# Patient Record
Sex: MALE | Race: WHITE | NOT HISPANIC OR LATINO | Employment: FULL TIME | URBAN - METROPOLITAN AREA
[De-identification: names, ages, dates, MRNs, and addresses within clinical notes are randomized per-mention and may not be internally consistent; named-entity substitution may affect disease eponyms.]

---

## 2019-03-10 ENCOUNTER — HOSPITAL ENCOUNTER (EMERGENCY)
Facility: CLINIC | Age: 34
Discharge: HOME | End: 2019-03-10
Attending: NURSE PRACTITIONER | Admitting: NURSE PRACTITIONER
Payer: COMMERCIAL

## 2019-03-10 VITALS
RESPIRATION RATE: 18 BRPM | OXYGEN SATURATION: 100 % | TEMPERATURE: 97.8 F | WEIGHT: 220 LBS | SYSTOLIC BLOOD PRESSURE: 121 MMHG | BODY MASS INDEX: 29.03 KG/M2 | HEART RATE: 90 BPM | DIASTOLIC BLOOD PRESSURE: 76 MMHG

## 2019-03-10 DIAGNOSIS — M54.50 LUMBAR BACK PAIN: ICD-10-CM

## 2019-03-10 PROCEDURE — 99282 EMERGENCY DEPT VISIT SF MDM: CPT | Performed by: NURSE PRACTITIONER

## 2019-03-10 PROCEDURE — 99284 EMERGENCY DEPT VISIT MOD MDM: CPT | Mod: Z6 | Performed by: NURSE PRACTITIONER

## 2019-03-10 RX ORDER — PREDNISONE 10 MG/1
TABLET ORAL
Qty: 32 TABLET | Refills: 0 | Status: SHIPPED | OUTPATIENT
Start: 2019-03-10 | End: 2019-03-20

## 2019-03-10 RX ORDER — CYCLOBENZAPRINE HCL 10 MG
10 TABLET ORAL 3 TIMES DAILY PRN
Qty: 20 TABLET | Refills: 0 | Status: SHIPPED | OUTPATIENT
Start: 2019-03-10 | End: 2019-03-16

## 2019-03-10 RX ORDER — OXYCODONE HYDROCHLORIDE 5 MG/1
5 TABLET ORAL EVERY 6 HOURS PRN
Qty: 12 TABLET | Refills: 0 | Status: SHIPPED | OUTPATIENT
Start: 2019-03-10 | End: 2020-03-16

## 2019-03-10 ASSESSMENT — ENCOUNTER SYMPTOMS: BACK PAIN: 1

## 2019-03-10 NOTE — ED PROVIDER NOTES
"  History     Chief Complaint   Patient presents with     Back Pain     HPI  Art Reece is a 34 year old male who presents to the ED today with c/o lower lumbar back pain since yesterday. Patient endorses long standing hx of back pain but it has not been this bad in \"quite some time\".  Patient has been taking Ibuprofen without any relief.  Patient did take one dose of Dilaudid, which was his wife's, and also did not obtain any relief from this either. Patient denies any numbness/tingling down his legs, denies any loss of bowel/bladder control.  Patient denies any injury/trauma.     Allergies:  No Known Allergies    Problem List:    There are no active problems to display for this patient.       Past Medical History:    Past Medical History:   Diagnosis Date     Back pain        Past Surgical History:    History reviewed. No pertinent surgical history.    Family History:    No family history on file.    Social History:  Marital Status:   [2]  Social History     Tobacco Use     Smoking status: Current Every Day Smoker     Packs/day: 0.50   Substance Use Topics     Alcohol use: Yes     Alcohol/week: 12.0 oz     Types: 24 Cans of beer per week     Comment: occ     Drug use: No        Medications:      cyclobenzaprine (FLEXERIL) 10 MG tablet   oxyCODONE (ROXICODONE) 5 MG tablet   predniSONE (DELTASONE) 10 MG tablet   Acetaminophen (TYLENOL PO)   ibuprofen (ADVIL,MOTRIN) 200 MG tablet   Nutritional Supplements (MELATONIN PO)         Review of Systems   Musculoskeletal: Positive for back pain.   All other systems reviewed and are negative.      Physical Exam   BP: 136/86  Pulse: 93  Temp: 97.8  F (36.6  C)  Resp: 18  Weight: 99.8 kg (220 lb)  SpO2: 100 %      Physical Exam   Constitutional: He is oriented to person, place, and time. He appears well-developed and well-nourished. No distress.   HENT:   Head: Normocephalic.   Eyes: Pupils are equal, round, and reactive to light.   Neck: Normal range of motion. "   Cardiovascular: Normal rate.   Pulmonary/Chest: Effort normal.   Abdominal: Soft. Bowel sounds are normal.   Musculoskeletal: Normal range of motion. He exhibits tenderness (across lower lumbar back, no spinous process tenderness). He exhibits no deformity.   Neurological: He is alert and oriented to person, place, and time. He displays normal reflexes. No cranial nerve deficit. Coordination normal.   Skin: Skin is warm and dry. Capillary refill takes less than 2 seconds. He is not diaphoretic.   Psychiatric: He has a normal mood and affect.       ED Course       Procedures    No results found for this or any previous visit (from the past 24 hour(s)).    Medications - No data to display    Assessments & Plan (with Medical Decision Making)  Lumbar back pain  Recommend follow up with Dr. Cristina/outpatient MRI if symptoms persist.  No neuro/focal deficits, no sign of cauda equina.  Will start patient on Prednisone taper for inflammation, Oxycodone for severe pain, narcotic safety and side effects discussed in detail.  Flexeril for muscle spasms.    Reasons to return to the ED discussed in detail.  Patient agreeable to plan of care and discharged in stable condition.      I have reviewed the nursing notes.    I have reviewed the findings, diagnosis, plan and need for follow up with the patient.     Medication List      Started    cyclobenzaprine 10 MG tablet  Commonly known as:  FLEXERIL  10 mg, Oral, 3 TIMES DAILY PRN     oxyCODONE 5 MG tablet  Commonly known as:  ROXICODONE  5 mg, Oral, EVERY 6 HOURS PRN     predniSONE 10 MG tablet  Commonly known as:  DELTASONE  Take 4 tablets daily for 5 days,  take 2 tablets daily for 3 days, take 1 tablet daily for 3 days, take half a tablet for 3 days.            Final diagnoses:   Lumbar back pain       3/10/2019   [facility] EMERGENCY DEPARTMENT     Tiff Cunningham, GEE CNP  03/10/19 1521

## 2019-03-10 NOTE — ED AVS SNAPSHOT
[facility] Emergency Department  [address] [place] [address]  Phone:  [phone]  Fax:  [phone]                                    Art Reece   MRN: [id number]    Department:  [facility] Emergency Department   Date of Visit:  3/10/2019           After Visit Summary Signature Page    I have received my discharge instructions, and my questions have been answered. I have discussed any challenges I see with this plan with the nurse or doctor.    ..........................................................................................................................................  Patient/Patient Representative Signature      ..........................................................................................................................................  Patient Representative Print Name and Relationship to Patient    ..................................................               ................................................  Date                                   Time    ..........................................................................................................................................  Reviewed by Signature/Title    ...................................................              ..............................................  Date                                               Time          22EPIC Rev 08/18

## 2019-03-10 NOTE — LETTER
March 10, 2019      To Whom It May Concern:      Art Reece was seen in our Emergency Department today, 03/10/19. Please excuse him from work tomorrow.    Thank you      Sincerely,        GEE Boothe CNP

## 2019-03-28 ENCOUNTER — ANCILLARY PROCEDURE (OUTPATIENT)
Dept: GENERAL RADIOLOGY | Facility: CLINIC | Age: 34
End: 2019-03-28
Attending: PHYSICIAN ASSISTANT
Payer: COMMERCIAL

## 2019-03-28 ENCOUNTER — TELEPHONE (OUTPATIENT)
Dept: NEUROSURGERY | Facility: CLINIC | Age: 34
End: 2019-03-28

## 2019-03-28 ENCOUNTER — OFFICE VISIT (OUTPATIENT)
Dept: NEUROSURGERY | Facility: CLINIC | Age: 34
End: 2019-03-28
Payer: COMMERCIAL

## 2019-03-28 VITALS — BODY MASS INDEX: 27.72 KG/M2 | HEIGHT: 74 IN | WEIGHT: 216 LBS | TEMPERATURE: 97.8 F

## 2019-03-28 DIAGNOSIS — G89.29 CHRONIC BILATERAL LOW BACK PAIN, WITH SCIATICA PRESENCE UNSPECIFIED: ICD-10-CM

## 2019-03-28 DIAGNOSIS — G89.29 CHRONIC BILATERAL LOW BACK PAIN, WITH SCIATICA PRESENCE UNSPECIFIED: Primary | ICD-10-CM

## 2019-03-28 DIAGNOSIS — M54.5 CHRONIC BILATERAL LOW BACK PAIN, WITH SCIATICA PRESENCE UNSPECIFIED: Primary | ICD-10-CM

## 2019-03-28 DIAGNOSIS — M54.5 CHRONIC BILATERAL LOW BACK PAIN, WITH SCIATICA PRESENCE UNSPECIFIED: ICD-10-CM

## 2019-03-28 PROCEDURE — 99203 OFFICE O/P NEW LOW 30 MIN: CPT | Performed by: PHYSICIAN ASSISTANT

## 2019-03-28 PROCEDURE — 72100 X-RAY EXAM L-S SPINE 2/3 VWS: CPT | Mod: TC

## 2019-03-28 ASSESSMENT — MIFFLIN-ST. JEOR: SCORE: 1981.58

## 2019-03-28 NOTE — TELEPHONE ENCOUNTER
Per Art ok to speak with Deann, they are calling to find out results of the xray from today and they would also like to know what the next step should be. Please call Deann to advise. Thank You Shania

## 2019-03-28 NOTE — PROGRESS NOTES
"Art Reece is a 34 year old male who presents for:  Chief Complaint   Patient presents with     Neurologic Problem     back pain          Initial Vitals:  Temp 97.8  F (36.6  C) (Temporal)   Ht 6' 1.5\" (1.867 m)   Wt 216 lb (98 kg)   BMI 28.11 kg/m   Estimated body mass index is 28.11 kg/m  as calculated from the following:    Height as of this encounter: 6' 1.5\" (1.867 m).    Weight as of this encounter: 216 lb (98 kg).. Body surface area is 2.25 meters squared. BP completed using cuff size: N/A  Data Unavailable        Nursing Comments:         Aram Patel CMA    "

## 2019-03-28 NOTE — LETTER
3/28/2019         RE: Art Reece  [address] [place] [facility] [address]        Dear Colleague,    Thank you for referring your patient, Art Reece, to the [facility]. Please see a copy of my visit note below.    Dr. Jun Cristina  [facility]  Neurosurgery Clinic Visit      CC: Low back pain    Primary care Provider: No Ref-Primary, Physician      Reason For Visit:   I was asked by Tiff Cunningham CNP to consult on the patient for lumbar back pain.      HPI: Art Reece is a 34 year old male who presents for evaluation of chronic low back pain x that flared up 2-3 weeks ago. He was recently seen at [facility] ED on 3/10 and discharged with prednisone taper, oxycodone, and flexeril. Pain is located in bilateral low back and radiates up to his neck or down into leg in no particular pattern it just shoots if he twists wrong. Describes the pain as a constant ache with shooting pain when he twists wrong. Pain is worsened with movement, sitting/standing too long, or twisting the wrong way. Pain is alleviated with prednisone the most and also slightly with flexeril and oxycodone. States BLE numbness when he sits for too long. No Denies bladder/bowel incontinence.    Current pain: 5-6/10 At worst: 10/10    Past Medical History:   Diagnosis Date     Back pain        Past Medical History reviewed with patient during visit.    No past surgical history on file.  Past Surgical History reviewed with patient during visit.    Current Outpatient Medications   Medication     Acetaminophen (TYLENOL PO)     ibuprofen (ADVIL,MOTRIN) 200 MG tablet     Nutritional Supplements (MELATONIN PO)     oxyCODONE (ROXICODONE) 5 MG tablet     No current facility-administered medications for this visit.        No Known Allergies    Social History     Socioeconomic History     Marital status:      Spouse name: Not on file     Number of children: Not on file     Years of education: Not on file      Highest education level: Not on file   Occupational History     Not on file   Social Needs     Financial resource strain: Not on file     Food insecurity:     Worry: Not on file     Inability: Not on file     Transportation needs:     Medical: Not on file     Non-medical: Not on file   Tobacco Use     Smoking status: Current Every Day Smoker     Packs/day: 0.50   Substance and Sexual Activity     Alcohol use: Yes     Alcohol/week: 12.0 oz     Types: 24 Cans of beer per week     Comment: occ     Drug use: No     Sexual activity: Yes     Partners: Female   Lifestyle     Physical activity:     Days per week: Not on file     Minutes per session: Not on file     Stress: Not on file   Relationships     Social connections:     Talks on phone: Not on file     Gets together: Not on file     Attends [employer] service: Not on file     Active member of club or organization: Not on file     Attends meetings of clubs or organizations: Not on file     Relationship status: Not on file     Intimate partner violence:     Fear of current or ex partner: Not on file     Emotionally abused: Not on file     Physically abused: Not on file     Forced sexual activity: Not on file   Other Topics Concern     Not on file   Social History Narrative     Not on file       No family history on file.       ROS: 10 point ROS neg other than the symptoms noted above in the HPI.    Vital Signs: There were no vitals taken for this visit.    Examination:  Constitutional:  Alert, well nourished, NAD.  HEENT: Normocephalic, atraumatic.   Pulmonary:  Without shortness of breath, normal effort.   Lymph: no lymphadenopathy to low back or LE.   Integumentary: Skin is free of rashes or lesions.   Cardiovascular:  No pitting edema of BLE.      Neurological:  Awake  Alert  Oriented x 3  Speech clear  Cranial nerves II - XII grossly intact  PERRL  EOMI  Face symmetric  Tongue midline  Motor exam   Hip Flexor:                Right: 5/5  Left:  5/5  Hip Adductor:              Right:  5/5  Left:  5/5  Hip Abductor:             Right:  5/5  Left:  5/5  Gastroc Soleus:        Right:  5/5  Left:  5/5  Tib/Ant:                      Right:  5/5  Left:  5/5  EHL:                          Right:  5/5  Left:  5/5       Sensation normal to bilateral lower extremities.    Reflexes are 2+ in the patellar and Achilles. There is no clonus. Downgoing Babinski.  Musculoskeletal:  Gait: Able to stand from a seated position. Normal non-antalgic, non-myelopathic gait.  Able to heel/toe walk without loss of balance  Lumbar examination reveals no tenderness of the spine or paraspinous muscles.  Hip height is symmetrical. Negative SI joint, sciatic notch or greater trochanteric tenderness to palpation bilaterally.  Straight leg raise is negative bilaterally.      Imaging:   Lumbar xray obtained today with no acute changes. Mild disc degeneration at L5-S1    Assessment/Plan:   Art Reece is a 34 year old male who presents for evaluation of chronic low back pain x that flared up 2-3 weeks ago. He was recently seen at [facility] ED on 3/10 and discharged with prednisone taper, oxycodone, and flexeril. Pain is located in bilateral low back and radiates up to his neck or down into leg in no particular pattern it just shoots if he twists wrong. Describes the pain as a constant ache with shooting pain when he twists wrong.  Lumbar XR reviewed today. Will have him start a course of physical therapy and obtain lumbar MRI for further evaluation as no relief with conservative treatment and call him with results. Patient voiced understanding and agreement.          Marlene Kaufman PA-C  [facility]  [address] [address]    Tel [phone]  Pager [phone]      Art Reece is a 34 year old male who presents for:  Chief Complaint   Patient presents with     Neurologic Problem     back pain          Initial Vitals:  Temp 97.8  F (36.6  C)  "(Temporal)   Ht 6' 1.5\" (1.867 m)   Wt 216 lb (98 kg)   BMI 28.11 kg/m    Estimated body mass index is 28.11 kg/m  as calculated from the following:    Height as of this encounter: 6' 1.5\" (1.867 m).    Weight as of this encounter: 216 lb (98 kg).. Body surface area is 2.25 meters squared. BP completed using cuff size: N/A  Data Unavailable        Nursing Comments:         Aram Patel CMA      Again, thank you for allowing me to participate in the care of your patient.        Sincerely,        Marlene Kaufman PA-C    "

## 2019-03-28 NOTE — PATIENT INSTRUCTIONS
Lumbar xray ordered - please complete today    Physical therapy referral placed - they will contact you to schedule    Lumbar MRI ordered - I will contact you with results

## 2019-03-29 NOTE — TELEPHONE ENCOUNTER
Per Marlene GALLOWAY, XR looks good with no acute fracture or other changes. Would recommend starting PT, as this appears to be more muscular. If pain persists, can obtain lumbar MRI for further evaluation.    Discussed with patient and he voiced agreement with plan. PT order already placed. Advised patient to call back if symptoms persist.

## 2019-04-17 ENCOUNTER — HOSPITAL ENCOUNTER (OUTPATIENT)
Dept: PHYSICAL THERAPY | Facility: CLINIC | Age: 34
Setting detail: THERAPIES SERIES
End: 2019-04-17
Attending: PHYSICIAN ASSISTANT
Payer: COMMERCIAL

## 2019-04-17 DIAGNOSIS — M54.5 CHRONIC BILATERAL LOW BACK PAIN, WITH SCIATICA PRESENCE UNSPECIFIED: ICD-10-CM

## 2019-04-17 DIAGNOSIS — G89.29 CHRONIC BILATERAL LOW BACK PAIN, WITH SCIATICA PRESENCE UNSPECIFIED: ICD-10-CM

## 2019-04-17 PROCEDURE — 97161 PT EVAL LOW COMPLEX 20 MIN: CPT | Mod: GP

## 2019-04-17 PROCEDURE — 97110 THERAPEUTIC EXERCISES: CPT | Mod: GP

## 2019-04-17 NOTE — PROGRESS NOTES
"   04/17/19 1446   General Information   Type of Visit Initial OP Ortho PT Evaluation   Start of Care Date 04/17/19   Referring Physician Marlene Kaufman PA-C   Orders Evaluate and Treat   Date of Order 03/29/19   Medical Diagnosis Low Back Pain   Surgical/Medical history reviewed Yes   General Information Comments Patient has had LBP for 8 years. They said he was out of shape and did some sort of massage. They said his spine had a deformity with limited curves and would need surgery. His back has always been painful but he tolerates it because he's used to it. On an off day recently, he had extremely bad back pain after getting up from the chair and went to the ER because the pain was \"different and worse\". Patient utilizes a back brace which gets painful after awhile. Patient says flexerol will work but he doesn't like medication.       Present No   Body Part(s)   Body Part(s) Lumbar Spine/SI   Presentation and Etiology   Impairments A. Pain;D. Decreased ROM;E. Decreased flexibility;F. Decreased strength and endurance;H. Impaired gait;K. Numbness   Functional Limitations perform activities of daily living;perform required work activities;perform desired leisure / sports activities   Symptom Location Low Back with radicular symptoms down the right side or up to his neck.   How/Where did it occur From insidious onset   Onset date of current episode/exacerbation 01/01/11   Chronicity Chronic   Pain rating (0-10 point scale) Best (/10);Worst (/10)   Best (/10) 3   Worst (/10) 10   Pain quality H. Other;A. Sharp;F. Stabbing   Pain quality comment Constant Pressure w/ occasional sharp spike   Frequency of pain/symptoms A. Constant   Pain/symptoms are: Worse in the morning   Pain/symptoms exacerbated by A. Sitting;B. Walking;C. Lifting;D. Carrying;G. Certain positions;K. Home tasks;L. Work tasks;I. Bending   Pain/symptoms eased by J. Braces/supports;I. OTC medication(s);K. Other;G. Heat   Pain eased by " comment Stretch Hamstring and Lumbar Spine   Progression of symptoms since onset: Worsened   Current / Previous Interventions   Diagnostic Tests: X-ray   X-ray Results unremarkable   Prior Level of Function   Prior Level of Function-Mobility Independent   Prior Level of Function-ADLs Independent   Current Level of Function   Current Community Support Family/friend caregiver   Patient role/employment history A. Employed   Employment Comments Works at a [employer] and occasionally works 14-15 hours. Lots of walking, lifting and manual labor.   Living environment House/townhome   Home/community accessibility Independent   Current equipment-Gait/Locomotion None   Current equipment-ADL None   Fall Risk Screen   Fall screen completed by PT   Have you fallen 2 or more times in the past year? No   Have you fallen and had an injury in the past year? No   Is patient a fall risk? No   System Outcome Measures   Outcome Measures Low Back Pain (see Oswestry and Cj)   Lumbar Spine/SI Objective Findings   Observation  Swayback posture in standing. Patient tolerates sitting most in a slightly flexed position   Integumentary Unremarkable   Posture Swayback posture. Flexed sitting posture.   Gait/Locomotion Unremarkable   Flexion ROM Full w/ pain on return to flexion   Extension ROM Limited and painfull   Right Side Bending ROM Full and painfree   Left Side Bending ROM Full and painfree   Lumbar ROM Comment Only limitation is extension   Hip Flexion (L2) Strength 5/5   Hip Abduction Strength 5/5   Hip Extension Strength 5/5   Knee Flexion Strength 5/5   Knee Extension (L3) Strength 5/5   Ankle Dorsiflexion (L4) Strength 5/5   Great Toe Extension (L5) Strength 5/5   Ankle Plantar Flexion (S1) Strength 5/5   Lumbar/Hip/Knee/Foot Strength Comments Strong and painfree   Hamstring Flexibility Tight   Hip Flexor Flexibility Tight   Quadricep Flexibility Tight   Piriformis Flexibility Tight   Lumbar/SI Flexibility Comments Tight and  limited mobility   SLR Negative   Slump Test Negative   Planned Therapy Interventions   Planned Therapy Interventions balance training;manual therapy;joint mobilization;neuromuscular re-education;ROM;strengthening;stretching   Planned Therapy Interventions Comment Reduce pain, muscle tightness, and rebalance muscles. Potentially aquatic therapy.   Planned Modality Interventions   Planned Modality Interventions Biofeedback;Cryotherapy;Electrical stimulation;Hot packs;Hydrotherapy;TENS;Traction;Ultrasound   Planned Modality Interventions Comments PRN Only   Clinical Impression   Criteria for Skilled Therapeutic Interventions Met yes, treatment indicated   PT Diagnosis Low Back Pain associated with muscle spasms   Influenced by the following impairments Pain with back extension. High Tone in the lumbar spine. Limited Mobility. Muscle Tightness.   Functional limitations due to impairments Patient is unable to tolerate leaning backwards or lumbar extension exercises without pain. Patient is unable to work without a back brace. Patient is unable to sleep at night due to pain.   Clinical Presentation Stable/Uncomplicated   Clinical Presentation Rationale Based on observation, evaluation and clinical reasoning.   Clinical Decision Making (Complexity) Low complexity   Therapy Frequency 1 time/week   Predicted Duration of Therapy Intervention (days/wks) 8-12 weeks   Risk & Benefits of therapy have been explained Yes   Patient, Family & other staff in agreement with plan of care Yes   Clinical Impression Comments Patient presents with chronic low back pain that presents like back spasms. He works multiple shifts and long hours and he is only able to work these with a back brace. Patient will likely respond well to 1. Core Strengthening, 2. Flexiblity and lumbar mobility exercises and 3. Aquatic therapy if the patient responds poorly to the first two options. Because of his rigorous lifestyle and his chronic symptoms patient  may take a long time to recover.   ORTHO GOALS   PT Ortho Eval Goals 1;2;3   Ortho Goal 1   Goal Identifier Cj   Goal Description Patinet will improve score on the Cj to 2 or less   Target Date 07/15/19   Ortho Goal 2   Goal Identifier JOSEPH   Goal Description Patient will improve score on the JOSEPH to 8 or less   Target Date 07/15/19   Ortho Goal 3   Goal Identifier Extension ROM   Goal Description Patient will restore full Extension AROM in standing without increased pain.   Target Date 07/15/19   Total Evaluation Time   PT Dre, Low Complexity Minutes (48287) 35

## 2019-05-03 ENCOUNTER — HOSPITAL ENCOUNTER (OUTPATIENT)
Dept: PHYSICAL THERAPY | Facility: CLINIC | Age: 34
Setting detail: THERAPIES SERIES
End: 2019-05-03
Attending: PHYSICIAN ASSISTANT
Payer: COMMERCIAL

## 2019-05-03 PROCEDURE — 97110 THERAPEUTIC EXERCISES: CPT | Mod: GP

## 2019-05-09 ENCOUNTER — HOSPITAL ENCOUNTER (OUTPATIENT)
Dept: PHYSICAL THERAPY | Facility: CLINIC | Age: 34
Setting detail: THERAPIES SERIES
End: 2019-05-09
Attending: PHYSICIAN ASSISTANT
Payer: COMMERCIAL

## 2019-05-09 PROCEDURE — 97110 THERAPEUTIC EXERCISES: CPT | Mod: GP

## 2019-05-09 PROCEDURE — 97530 THERAPEUTIC ACTIVITIES: CPT | Mod: GP

## 2019-05-09 NOTE — PROGRESS NOTES
Outpatient Physical Therapy Discharge Note     Patient: Art Reece  : 1985    Beginning/End Dates of Reporting Period:  2019 to 2019    Referring Provider: Marlene Kaufman PA-C    Therapy Diagnosis: Low Back Pain     Client Self Report: Patient reports his back is much better. His core sore from the plank and exercises but overall he's been doing quite well.     Objective Measurements:  Objective Measure: Cj  Details: 1  Objective Measure: JOSEPH  Details: 5                              Outcome Measures (most recent score):  Cj STarT Sub-Score (Q5-9): 0  Cj STarT Total Score (all 9): 1  Oswestry Score (%): 10 %    Goals:  Goal Identifier Cj   Goal Description Patient will improve score on the Cj to 2 or less   Target Date 07/15/19   Date Met  19   Progress: Patient reduced Cj to a score of 1     Goal Identifier JOSEPH   Goal Description Patient will improve score on the JOSEPH to 8 or less   Target Date 07/15/19   Date Met  19   Progress: Patient reduced JOSEPH to a score of 5     Goal Identifier Extension ROM   Goal Description Patient will restore full Extension AROM in standing without increased pain.   Target Date 07/15/19   Date Met      Progress: Patient has slight pain at end range Extension, but can achieve end range.     Progress Toward Goals:   Progress this reporting period: Patient's pain has reduced significantly and he is comfortable with his home exercise program. Patient can work 12 hours without increases in low back pain. Patient also has full lumbar flexion, rotation and side bending without pain.          Plan:  Discharge from therapy.    Discharge:    Reason for Discharge: Patient has met most goals and no skilled PT is required.  .    Equipment Issued: None    Discharge Plan: Patient to continue home program.

## 2020-03-12 ENCOUNTER — TELEPHONE (OUTPATIENT)
Dept: ORTHOPEDICS | Facility: OTHER | Age: 35
End: 2020-03-12

## 2020-03-12 NOTE — TELEPHONE ENCOUNTER
Spoke with pt and his wife that we are unable to give narcotics without being seen.  They have tried the ER they were seen at initially and were unsuccessful. Per Meenakshi Atwood PA-C they were instructed to take Ibuprofen 800 mg and alternate with tylenol up to 1000 mg and ice if this is not working they may need to go to the ER again.

## 2020-03-12 NOTE — TELEPHONE ENCOUNTER
Patient was given #15 of norco on 3/9/2020 for a left elbow fracture - was seen at [facility]. He will be out of medication tomorrow and has an appt with GAY Brown on 3/16/2020.      Jaye BUENROSTRO, Lead RN, BSN. . .  3/12/2020, 9:12 AM

## 2020-03-12 NOTE — TELEPHONE ENCOUNTER
Art was seen in the ED and was given pain medication.  He was told not to follow up until next week for his injury.  He will be out of pain medication on Friday.  He has no primary provider to request pain medication from.  Please advise.

## 2020-03-16 ENCOUNTER — ANCILLARY PROCEDURE (OUTPATIENT)
Dept: GENERAL RADIOLOGY | Facility: CLINIC | Age: 35
End: 2020-03-16
Attending: PHYSICIAN ASSISTANT
Payer: OTHER MISCELLANEOUS

## 2020-03-16 ENCOUNTER — OFFICE VISIT (OUTPATIENT)
Dept: ORTHOPEDICS | Facility: CLINIC | Age: 35
End: 2020-03-16
Payer: OTHER MISCELLANEOUS

## 2020-03-16 VITALS
BODY MASS INDEX: 28.28 KG/M2 | DIASTOLIC BLOOD PRESSURE: 72 MMHG | HEIGHT: 73 IN | WEIGHT: 213.4 LBS | SYSTOLIC BLOOD PRESSURE: 120 MMHG

## 2020-03-16 DIAGNOSIS — S52.135A CLOSED NONDISPLACED FRACTURE OF NECK OF LEFT RADIUS, INITIAL ENCOUNTER: Primary | ICD-10-CM

## 2020-03-16 DIAGNOSIS — M25.522 LEFT ELBOW PAIN: ICD-10-CM

## 2020-03-16 DIAGNOSIS — S52.125A CLOSED NONDISPLACED FRACTURE OF HEAD OF LEFT RADIUS, INITIAL ENCOUNTER: ICD-10-CM

## 2020-03-16 DIAGNOSIS — S02.2XXA CLOSED FRACTURE OF NASAL BONE, INITIAL ENCOUNTER: ICD-10-CM

## 2020-03-16 PROCEDURE — 73080 X-RAY EXAM OF ELBOW: CPT | Mod: TC

## 2020-03-16 PROCEDURE — 99204 OFFICE O/P NEW MOD 45 MIN: CPT | Performed by: PHYSICIAN ASSISTANT

## 2020-03-16 ASSESSMENT — PAIN SCALES - GENERAL: PAINLEVEL: MILD PAIN (2)

## 2020-03-16 ASSESSMENT — MIFFLIN-ST. JEOR: SCORE: 1956.86

## 2020-03-16 NOTE — PROGRESS NOTES
ORTHOPEDIC CONSULT      Chief Complaint: Art Reece is a 35 year old right hand dominant male who works at [employer].  He enjoys splitting wood and guns and fishing occasionally.    He is being seen for   Chief Complaints and History of Present Illnesses   Patient presents with     Consult     left elbow injury from fall on 3/9/20?         History of Present Illness:   Mechanism of Injury: Patient fell off an 8 foot wall so they consider the fall to be about 14 feet with his height.  Patient did hit his face.  Location: Left elbow  Duration of Pain: 7 days.  Date of injury is 3/9/2020.  This is a Workmen's Comp. injury.  Rating of Pain: 1-2 out of 10  Pain Quality: Achy  Pain is better with: Splint  Pain is worse with: Bumping splint  Treatment so far consists of: Multiple scans at [facility].  Patient did get a CT scan of his face cervical spine thoracic spine and a CT scan of his head.  He also had 3D recons on the thoracic spine.  Patient got AP and lateral of right and left knee.  All these studies were scanned into our system today also left elbow x-rays.  Patient is on Norco for pain.  Associated Features: Left wrist pain secondary to splint.  Left elbow pain.  Right wrist is feeling good without any pain.  Prior history of related problems: No previous surgery or trauma to the left elbow.  Pain is Limiting: Activity with the left upper extremity and work  Here to: Orthopedic consultation  The Pain Has: Gotten slightly better  Additional History: Patient complaining of left wrist pain secondary to the splint.  We do not want to remove the splint but we did modify today.  We talked about this today.      Patient's past medical, surgical, social and family histories reviewed.     Past Medical History:   Diagnosis Date     Back pain         No past surgical history on file.    Medications:  Acetaminophen (TYLENOL PO), Take  by mouth.    ibuprofen (ADVIL,MOTRIN) 200 MG tablet, Take 3  "tablets (600 mg) by mouth every 8 hours as needed for pain  Nutritional Supplements (MELATONIN PO), Take  by mouth.      No current facility-administered medications on file prior to visit.       Allergies   Allergen Reactions     Norco [Hydrocodone-Acetaminophen] Other (See Comments)     Very sedated, unable to function        Social History     Occupational History     Not on file   Tobacco Use     Smoking status: Current Every Day Smoker     Packs/day: 0.50     Smokeless tobacco: Never Used   Substance and Sexual Activity     Alcohol use: Yes     Alcohol/week: 20.0 standard drinks     Types: 24 Cans of beer per week     Comment: occ     Drug use: No     Sexual activity: Yes     Partners: Female     No pertinent family history     REVIEW OF SYSTEMS  10 point review systems performed otherwise negative as noted as per history of present illness.    Physical Exam:  Vitals: /72   Ht 1.854 m (6' 1\")   Wt 96.8 kg (213 lb 6.4 oz)   BMI 28.15 kg/m    BMI= Body mass index is 28.15 kg/m .    Constitutional: healthy, alert and no acute distress   Psychiatric: mentation appears normal and affect normal/bright  NEURO: no focal deficits, CMS intact left upper extremity   RESP: Normal with easy respirations and no use of accessory muscles to breathe, no audible wheezing or retractions  CV: +2 radial pulse and his hand is warm to palpation.   SKIN: Patient has a long-arm posterior splint on.  I did remove the Ace wrap to loosen it.  Patient did not have any cast abrasions or splint abrasions proximally or distally.  The distal side of the splint did come out to his hand and he was having some wrist pain so we did modify that and shorten it so he has wrist range of motion now.  The rest of the hand and upper arm that I can see with no erythema, rashes, excoriation, or breakdown. No evidence of infection.   MUSCULOSKELETAL:    INSPECTION of left elbow: No gross deformities, erythema, edema, ecchymosis, atrophy or " fasciculations of the skin that I can see today.  Patient has a long-arm posterior splint on.    PALPATION: No tenderness to palpation of the hand or wrist or upper arm.  No increased warmth noted.    ROM: Patient able to move all 5 digits and able to flex and extendwrist.  Patient able to forward flex and do slight Codman's with the left shoulder.  No range of motion of the elbow as we are concerned about fragments in the joint.     STRENGTH: 5 out of 5 , interosseous and thumb without pain.     SPECIAL TEST: None today  GAIT: non-antalgic  Lymph: no palpable lymph nodes    Diagnostic Modalities:  Recent Results (from the past 744 hour(s))   XR Elbow Left G/E 3 Views    Narrative    LEFT ELBOW THREE OR MORE VIEWS  3/16/2020 10:12 AM     HISTORY: Left elbow pain.      Impression    IMPRESSION: Radial head/neck fracture. No radiographically apparent  radial head articular surface depression. However, CT would be more  sensitive in this regard. On the lateral view, coronoid process  fracture cannot be excluded as well.     I agree with the above reading.  Also I did review these x-rays with Dr. Mckenna and we feel that there could be some fracture fragment in the articular area.  We are getting a CT scan to further evaluate this.    There were scans that were done at [facility] that did get scanned in today:     I also reviewed AP and lateral of right and left knee that has no fracture no dislocation or tumor.    I reviewed a CT of the face, CT cervical spine, CT thoracic spine, CT head.  Nasal fracture notified but the rest of the scans seem to be okay by my review.    Independent visualization of the images was performed.    SPLINT MODIFICATION: Patient having some left wrist discomfort in the posterior splint did come up over the wrist.  I was able to use a cast saw and trim back the fiberglass/Ortho-Glass and remove the portion that was going over the wrist.  This will allow him to have wrist range of  motion.  Patient immediately felt that the removal of this section of the splint helped tremendously for his wrist pain.  CMS intact before and after modification of splint.  No splint abrasions noted on the skin of the wrist.    Impression:   1.  7 days status post left radial head and neck fracture, intra-articular.  2.  7 days status post nasal fracture  3.  Workmen's Comp. injury    Plan:  All of the above pertinent physical exam and imaging modalities findings was reviewed with Art and his wife.                                          CONSERVATIVE CARE:    Patient Instructions:  1. Xrays: Today I reviewed x-rays done today on the left elbow as well as x-rays done that were scanned into our system from [place] and done on 3/9/2020.  I looked at left elbow x-rays from [place] as well as left and right knee x-rays as well as a CT of the face and head and cervical and thoracic area.  2. Anticoagulation: Not needed for these fractures.  Minimal risk of clot  3. Pain Medication: Continue with oxycodone as needed also Tylenol or ibuprofen alternating is good idea.  Rest ice and elevation is the main stay.  4. Weight Bearing: Nonweightbearing left upper extremity  5. Activity/Therapy: Range of motion of fingers hand and wrist and shoulder on the left side but no range of motion of elbow.  6. Cast/Splint/Brace/Sling: Keep splint on clean and dry until you hear more from us.  There is some concern about fragments in your joints we do not want your elbow to move yet until we confirm that there is not fragments in your joint.    Today we modified yourSplint so that you can now move that left wrist better.  7. Work: This is a Workmen's Comp. injury.  Today we stated off work for the next 3 weeks, the duration of off work will depend on the CT scan we are getting and possibility of surgery.  When the patient goes back to work he will need restrictions.  8.  Nasal fracture: We do not follow the facial fractures here  in orthopedics.  We put in an order for ENT.  They are upstairs.  You will get a call in a day or 2 to get this set up.  They can state if you do not need any further follow-up.  I feel better having them see you once to make sure.  9.  CT scan: I have ordered an CT for you.  Please call [phone] to schedule your CT.  I will call you after the CT with the results and plan.   10.  Plan: If there are no fragments in the elbow then we might be able to treat this with just the sling and range of motion after 2 weeks.  If there are fragments in the elbow then we would be discussing possible surgery to get those fragments out.  It would be a longer recovery.  Follow-up:  I will call you with the results and a plan after the CT scan is done.  Re-x-ray on return: Yes, AP lateral and oblique of left elbow.  Check with provider on this just secondary to the fact that we are getting a CT scan and what course the patient will be going on.    BP Readings from Last 1 Encounters:   03/16/20 120/72       BP noted to be well controlled today in office.      Patient does use Tobacco products. Patient not ready to quit at this time.  Strongly encouraged smoking cessation.  Risks of smoking and benefits of quitting were discussed.  Information offered: AVS with information about stopping smoking and advised to discuss smoking cessation medications/strategies with Primary care provider.  3-5 Minutes were spent in counseling.    I did discuss this case with Dr. Mckenna and show him the x-rays to confirm the proper treatment plan.  He did want a CT scan to make sure that there was nothing in the joint before we proceeded with nonoperative treatment.    This note was dictated with PerioSeal.    Alejandro Brown PA-C

## 2020-03-16 NOTE — LETTER
[address] [address]  Phone: [phone]  Fax: [phone]    March 16, 2020        Art Reece  [address] [facility] [address]          To whom it may concern:    RE: Art Reece    Patient was seen and treated today at our clinic.  Patient may not return to work for 3 weeks.  Will re-elevate after CT scan.    Please contact me for questions or concerns.      Sincerely,        Alejandro Brown PA-C

## 2020-03-16 NOTE — LETTER
[address] [address]  Phone: [phone]  Fax: [phone]    March 16, 2020        Art Reece  [address] [facility] [address]          To whom it may concern:    RE: Art Reece    Patient was seen and treated today at our clinic.  Patient may not return to work for 3 weeks.  Will elevate after CT scan.    Please contact me for questions or concerns.      Sincerely,        Alejandro Brown PA-C

## 2020-03-16 NOTE — PATIENT INSTRUCTIONS
Encounter Diagnoses   Name Primary?     Closed nondisplaced fracture of head of left radius, initial encounter      Closed nondisplaced fracture of neck of left radius, initial encounter Yes     Closed fracture of nasal bone, initial encounter      Rest, ice and elevate above heart level as needed for pain control  Plan:  1. Xrays: Today I reviewed x-rays done today on the left elbow as well as x-rays done that were scanned into our system from [place] and done on 3/9/2020.  I looked at left elbow x-rays from [place] as well as left and right knee x-rays as well as a CT of the face and head and cervical and thoracic area.  2. Anticoagulation: Not needed for these fractures.  Minimal risk of clot  3. Pain Medication: Continue with oxycodone as needed also Tylenol or ibuprofen alternating is good idea.  Rest ice and elevation is the main stay.  4. Weight Bearing: Nonweightbearing left upper extremity  5. Activity/Therapy: Range of motion of fingers hand and wrist and shoulder on the left side but no range of motion of elbow.  6. Cast/Splint/Brace/Sling: Keep splint on clean and dry until you hear more from us.  There is some concern about fragments in your joints we do not want your elbow to move yet until we confirm that there is not fragments in your joint.    Today we modified yourSplint so that you can now move that left wrist better.  7. Work: This is a Workmen's Comp. injury.  Today we stated off work for the next 3 weeks, the duration of off work will depend on the CT scan we are getting and possibility of surgery.  When the patient goes back to work he will need restrictions.  8.  Nasal fracture: We do not follow the facial fractures here in orthopedics.  We put in an order for ENT.  They are upstairs.  You will get a call in a day or 2 to get this set up.  They can state if you do not need any further follow-up.  I feel better having them see you once to make sure.  9.  CT scan: I have ordered an CT for  you.  Please call [phone] to schedule your CT.  I will call you after the CT with the results and plan.   10.  Plan: If there are no fragments in the elbow then we might be able to treat this with just the sling and range of motion after 2 weeks.  If there are fragments in the elbow then we would be discussing possible surgery to get those fragments out.  It would be a longer recovery.  Follow-up:  I will call you with the results and a plan after the CT scan is done.    [employer] and [employer] may offer reliable information regarding your diagnosis and treatment plan.    THANK YOU for coming in today. If you receive a survey via [employer] or mail please let us know if there was anything you especially appreciated today or if there is any way we can improve our clinic. We appreciate your input.    GENERAL INFORMATION:  Our hours are:  Pending    [facility] for any issues or concerns: [phone]      We are not in the office Thursdays. Therefore non- urgent calls and medical messages received on Thursday will be addressed when we are back in the office on Wednesday. Urgent matters will be reviewed and addressed by one of our partners in the office as needed.    If lab work was done today as part of your evaluation you will generally be contacted via [employer], mail, or phone with the results within 1-5 days. If there is an alarming result we will contact you by phone. Lab results come back at varying times, I generally wait until all labs are resulted before making comments on results. Please note labs are automatically released to [employer] (if you have signed up for it) once available-at times you may see these prior to my having a chance to review them as well.    If you need refills please contact your pharmacist. They will send a refill request to me to review. Please allow 3 business days for us to process all refill requests. All narcotic refills should be handled in the clinic at  the time of your visit.

## 2020-03-16 NOTE — LETTER
[address] [address]  Phone: [phone]  Fax: [phone]    March 16, 2020        Art Reece  [address] [facility] [address]          To whom it may concern:    RE: Art Reece    Patient was seen and treated today at our clinic.  Patient may not return to work for 3 weeks.  Will re-evaluate after CT scan.    Please contact me for questions or concerns.      Sincerely,        Alejandro Brown PA-C

## 2020-03-16 NOTE — LETTER
3/16/2020         RE: Art Reece  [id number] [facility] [address]        Dear Colleague,    Thank you for referring your patient, Art Reece, to the [facility]. Please see a copy of my visit note below.    ORTHOPEDIC CONSULT      Chief Complaint: Art Reece is a 35 year old right hand dominant male who works at [employer].  He enjoys splitting wood and guns and fishing occasionally.    He is being seen for   Chief Complaints and History of Present Illnesses   Patient presents with     Consult     left elbow injury from fall on 3/9/20?         History of Present Illness:   Mechanism of Injury: Patient fell off an 8 foot wall so they consider the fall to be about 14 feet with his height.  Patient did hit his face.  Location: Left elbow  Duration of Pain: 7 days.  Date of injury is 3/9/2020.  This is a Workmen's Comp. injury.  Rating of Pain: 1-2 out of 10  Pain Quality: Achy  Pain is better with: Splint  Pain is worse with: Bumping splint  Treatment so far consists of: Multiple scans at [facility].  Patient did get a CT scan of his face cervical spine thoracic spine and a CT scan of his head.  He also had 3D recons on the thoracic spine.  Patient got AP and lateral of right and left knee.  All these studies were scanned into our system today also left elbow x-rays.  Patient is on Norco for pain.  Associated Features: Left wrist pain secondary to splint.  Left elbow pain.  Right wrist is feeling good without any pain.  Prior history of related problems: No previous surgery or trauma to the left elbow.  Pain is Limiting: Activity with the left upper extremity and work  Here to: Orthopedic consultation  The Pain Has: Gotten slightly better  Additional History: Patient complaining of left wrist pain secondary to the splint.  We do not want to remove the splint but we did modify today.  We talked about this today.      Patient's past medical, surgical,  "social and family histories reviewed.     Past Medical History:   Diagnosis Date     Back pain         No past surgical history on file.    Medications:  Acetaminophen (TYLENOL PO), Take  by mouth.    ibuprofen (ADVIL,MOTRIN) 200 MG tablet, Take 3 tablets (600 mg) by mouth every 8 hours as needed for pain  Nutritional Supplements (MELATONIN PO), Take  by mouth.      No current facility-administered medications on file prior to visit.       Allergies   Allergen Reactions     Norco [Hydrocodone-Acetaminophen] Other (See Comments)     Very sedated, unable to function        Social History     Occupational History     Not on file   Tobacco Use     Smoking status: Current Every Day Smoker     Packs/day: 0.50     Smokeless tobacco: Never Used   Substance and Sexual Activity     Alcohol use: Yes     Alcohol/week: 20.0 standard drinks     Types: 24 Cans of beer per week     Comment: occ     Drug use: No     Sexual activity: Yes     Partners: Female     No pertinent family history     REVIEW OF SYSTEMS  10 point review systems performed otherwise negative as noted as per history of present illness.    Physical Exam:  Vitals: /72   Ht 1.854 m (6' 1\")   Wt 96.8 kg (213 lb 6.4 oz)   BMI 28.15 kg/m    BMI= Body mass index is 28.15 kg/m .    Constitutional: healthy, alert and no acute distress   Psychiatric: mentation appears normal and affect normal/bright  NEURO: no focal deficits, CMS intact left upper extremity   RESP: Normal with easy respirations and no use of accessory muscles to breathe, no audible wheezing or retractions  CV: +2 radial pulse and his hand is warm to palpation.   SKIN: Patient has a long-arm posterior splint on.  I did remove the Ace wrap to loosen it.  Patient did not have any cast abrasions or splint abrasions proximally or distally.  The distal side of the splint did come out to his hand and he was having some wrist pain so we did modify that and shorten it so he has wrist range of motion now.  " The rest of the hand and upper arm that I can see with no erythema, rashes, excoriation, or breakdown. No evidence of infection.   MUSCULOSKELETAL:    INSPECTION of left elbow: No gross deformities, erythema, edema, ecchymosis, atrophy or fasciculations of the skin that I can see today.  Patient has a long-arm posterior splint on.    PALPATION: No tenderness to palpation of the hand or wrist or upper arm.  No increased warmth noted.    ROM: Patient able to move all 5 digits and able to flex and extendwrist.  Patient able to forward flex and do slight Codman's with the left shoulder.  No range of motion of the elbow as we are concerned about fragments in the joint.     STRENGTH: 5 out of 5 , interosseous and thumb without pain.     SPECIAL TEST: None today  GAIT: non-antalgic  Lymph: no palpable lymph nodes    Diagnostic Modalities:  Recent Results (from the past 744 hour(s))   XR Elbow Left G/E 3 Views    Narrative    LEFT ELBOW THREE OR MORE VIEWS  3/16/2020 10:12 AM     HISTORY: Left elbow pain.      Impression    IMPRESSION: Radial head/neck fracture. No radiographically apparent  radial head articular surface depression. However, CT would be more  sensitive in this regard. On the lateral view, coronoid process  fracture cannot be excluded as well.     I agree with the above reading.  Also I did review these x-rays with Dr. Mckenna and we feel that there could be some fracture fragment in the articular area.  We are getting a CT scan to further evaluate this.    There were scans that were done at [facility] that did get scanned in today:     I also reviewed AP and lateral of right and left knee that has no fracture no dislocation or tumor.    I reviewed a CT of the face, CT cervical spine, CT thoracic spine, CT head.  Nasal fracture notified but the rest of the scans seem to be okay by my review.    Independent visualization of the images was performed.    SPLINT MODIFICATION: Patient having some left  wrist discomfort in the posterior splint did come up over the wrist.  I was able to use a cast saw and trim back the fiberglass/Ortho-Glass and remove the portion that was going over the wrist.  This will allow him to have wrist range of motion.  Patient immediately felt that the removal of this section of the splint helped tremendously for his wrist pain.  CMS intact before and after modification of splint.  No splint abrasions noted on the skin of the wrist.    Impression:   1.  7 days status post left radial head and neck fracture, intra-articular.  2.  7 days status post nasal fracture  3.  Workmen's Comp. injury    Plan:  All of the above pertinent physical exam and imaging modalities findings was reviewed with Art and his wife.                                          CONSERVATIVE CARE:    Patient Instructions:  1. Xrays: Today I reviewed x-rays done today on the left elbow as well as x-rays done that were scanned into our system from [facility] and done on 3/9/2020.  I looked at left elbow x-rays from [facility] as well as left and right knee x-rays as well as a CT of the face and head and cervical and thoracic area.  2. Anticoagulation: Not needed for these fractures.  Minimal risk of clot  3. Pain Medication: Continue with oxycodone as needed also Tylenol or ibuprofen alternating is good idea.  Rest ice and elevation is the main stay.  4. Weight Bearing: Nonweightbearing left upper extremity  5. Activity/Therapy: Range of motion of fingers hand and wrist and shoulder on the left side but no range of motion of elbow.  6. Cast/Splint/Brace/Sling: Keep splint on clean and dry until you hear more from us.  There is some concern about fragments in your joints we do not want your elbow to move yet until we confirm that there is not fragments in your joint.    Today we modified yourSplint so that you can now move that left wrist better.  7. Work: This is a Workmen's Comp. injury.  Today we stated off work for the  next 3 weeks, the duration of off work will depend on the CT scan we are getting and possibility of surgery.  When the patient goes back to work he will need restrictions.  8.  Nasal fracture: We do not follow the facial fractures here in orthopedics.  We put in an order for ENT.  They are upstairs.  You will get a call in a day or 2 to get this set up.  They can state if you do not need any further follow-up.  I feel better having them see you once to make sure.  9.  CT scan: I have ordered an CT for you.  Please call [phone] to schedule your CT.  I will call you after the CT with the results and plan.   10.  Plan: If there are no fragments in the elbow then we might be able to treat this with just the sling and range of motion after 2 weeks.  If there are fragments in the elbow then we would be discussing possible surgery to get those fragments out.  It would be a longer recovery.  Follow-up:  I will call you with the results and a plan after the CT scan is done.  Re-x-ray on return: Yes, AP lateral and oblique of left elbow.  Check with provider on this just secondary to the fact that we are getting a CT scan and what course the patient will be going on.    BP Readings from Last 1 Encounters:   03/16/20 120/72       BP noted to be well controlled today in office.      Patient does use Tobacco products. Patient not ready to quit at this time.  Strongly encouraged smoking cessation.  Risks of smoking and benefits of quitting were discussed.  Information offered: AVS with information about stopping smoking and advised to discuss smoking cessation medications/strategies with Primary care provider.  3-5 Minutes were spent in counseling.    I did discuss this case with Dr. Mckenna and show him the x-rays to confirm the proper treatment plan.  He did want a CT scan to make sure that there was nothing in the joint before we proceeded with nonoperative treatment.    This note was dictated with [facility].    Alejandro Brown PA-C          Again, thank you for allowing me to participate in the care of your patient.        Sincerely,        Alejandro Brown PA-C

## 2020-03-19 ENCOUNTER — HOSPITAL ENCOUNTER (OUTPATIENT)
Dept: CT IMAGING | Facility: CLINIC | Age: 35
Discharge: HOME | End: 2020-03-19
Attending: PHYSICIAN ASSISTANT | Admitting: PHYSICIAN ASSISTANT
Payer: OTHER MISCELLANEOUS

## 2020-03-19 DIAGNOSIS — S02.2XXA CLOSED FRACTURE OF NASAL BONE, INITIAL ENCOUNTER: ICD-10-CM

## 2020-03-19 DIAGNOSIS — S52.135A CLOSED NONDISPLACED FRACTURE OF NECK OF LEFT RADIUS, INITIAL ENCOUNTER: ICD-10-CM

## 2020-03-19 PROCEDURE — 73200 CT UPPER EXTREMITY W/O DYE: CPT | Mod: LT

## 2020-03-20 ENCOUNTER — TELEPHONE (OUTPATIENT)
Dept: ORTHOPEDICS | Facility: OTHER | Age: 35
End: 2020-03-20

## 2020-03-20 DIAGNOSIS — S52.032A CLOSED DISPLACED INTRA-ARTICULAR FRACTURE OF OLECRANON PROCESS OF LEFT ULNA, INITIAL ENCOUNTER: ICD-10-CM

## 2020-03-20 DIAGNOSIS — S52.121A CLOSED DISPLACED FRACTURE OF HEAD OF RIGHT RADIUS, INITIAL ENCOUNTER: Primary | ICD-10-CM

## 2020-03-20 DIAGNOSIS — S52.042A DISPLACED FRACTURE OF CORONOID PROCESS OF LEFT ULNA, INITIAL ENCOUNTER FOR CLOSED FRACTURE: ICD-10-CM

## 2020-03-20 DIAGNOSIS — S52.132A CLOSED DISPLACED FRACTURE OF NECK OF LEFT RADIUS, INITIAL ENCOUNTER: ICD-10-CM

## 2020-03-20 NOTE — TELEPHONE ENCOUNTER
Writing nurse returned call to patient and wife to inform them that there were fragments found in CT done yesterday. Patients wife was informed and waiting to hear from Alejandro Brown PA-C to discuss possible surgery.    From last office visit note:   Plan: If there are no fragments in the elbow then we might be able to treat this with just the sling and range of motion after 2 weeks.  If there are fragments in the elbow then we would be discussing possible surgery to get those fragments out.  It would be a longer recovery.    From imaging on 3/19/2020:  IMPRESSION:   1. Comminuted fracture of the radial head and neck, as above.   2. Mildly comminuted fracture of the coronoid, with   displacement/distraction.   3. Tiny intra-articular fragments.     Ruthie Yancey RN on 3/20/2020 at 4:09 PM

## 2020-03-20 NOTE — TELEPHONE ENCOUNTER
Art and his wife called back and want to hear from the RN today.  Spoke with Ruthie CHAU and asked her if she will be able to call today and she stated that she will call them today.

## 2020-03-20 NOTE — TELEPHONE ENCOUNTER
[facility] phone call message- patient requests results:    Name of test or procedure: CT elbow  Date of test of procedure: 3/19/2020  Location of the test or procedure: [facility]  OK to leave the result message on voice mail or with a family member? YES    Additional comments: Please call Art or his wife Peg with result today.  They do not want to wait until Monday for results.    Call taken on 3/20/2020 at 11:23 AM by RONEY Adams

## 2020-03-23 NOTE — TELEPHONE ENCOUNTER
I was off since 3/17/2020, on my return I reviewed the CT scan.  There are intra-articular fragments.  Technically, he has a comminuted fracture of the radial head and neck as well as comminuted fracture of the coronoid with displacement and distraction and some tiny articular fragments.  I was able to verbally discuss these CT scan results with Dr. Mckenna.  He would like the patient to follow-up within 1 week with a elbow specialist possibly someone that could do arthroscopy of the elbow.  I did send a message to Dr. Zamudio that comes to [place] to see if she would be interested.  I put in a referral for [facility] for the patient to be seen within 1 week secondary to the articular fragments and possible elbow instability.  Patient is to stay in the posterior splint until follow-up.  Would be ideal if the patient saw a provider that did elbow arthroscopy also.  I included that all in the order.  I called the patient and discussed all the details with the patient.  I will have him continue in the elbow splint until seen by the specialist.    I called and talked to the patient about his Elbow CT scan.  I also talked to his wife briefly.  Patient is doing well and remaining in his posterior splint.  He has some swelling and has been taking ibuprofen but not much pain.  I did explain the results of his CT scan and also that in order is in for him to be seen within the next 5 to 7 days.  He understands.  He understands why he needs to see an elbow specialist.

## 2020-03-24 ENCOUNTER — TELEPHONE (OUTPATIENT)
Dept: ORTHOPEDICS | Facility: CLINIC | Age: 35
End: 2020-03-24

## 2020-03-24 NOTE — TELEPHONE ENCOUNTER
RECORDS RECEIVED FROM: Communited fracture of left radial head/neck, displaced midly comminuted fracture of the coronoid, WC DOI: 3/9/2020, OK per Dr. Zamudio, ref'd by MARCIA Lomax, discussed visitor policy - [facility]   DATE RECEIVED:    NOTES STATUS DETAILS   OFFICE NOTE from referring provider Internal Alejandro Brown PA-C   OFFICE NOTE from other specialist N/A    DISCHARGE SUMMARY from hospital N/A    DISCHARGE REPORT from the ER N/A    OPERATIVE REPORT N/A    MEDICATION LIST Internal    IMPLANT RECORD/STICKER N/A    LABS     CBC/DIFF N/A    CULTURES N/A    INJECTIONS DONE IN RADIOLOGY N/A    MRI N/A    CT SCAN Internal    XRAYS (IMAGES & REPORTS) Internal    TUMOR     PATHOLOGY  Slides & report N/A      03/24/20   2:51 PM   Pre-visit complete  Cindy Glamm, CMA

## 2020-03-24 NOTE — TELEPHONE ENCOUNTER
I was able to get a hold of Dr. Zamudio the staff messaging and she stated that she might be able to see the patient either tomorrow or Friday to check the elbow stability.  I am very appreciative of this.  I did call the patient to let him know however the sound like they had already called him and this was in process.  If the elbow is unstable it will need surgery but if it is stable he might be able to avoid having surgery.  I relayed this information to him.  He was happy with the information.

## 2020-03-24 NOTE — TELEPHONE ENCOUNTER
Discussed case with Dr. Zamudio and Alejandro Brown PA-C. Pt is on for an appt with Dr. Zamudio tomorrow at the [facility]. Pt is aware and will let us know if anything changes.    Jackson Inman RN

## 2020-03-25 ENCOUNTER — PRE VISIT (OUTPATIENT)
Dept: ORTHOPEDICS | Facility: CLINIC | Age: 35
End: 2020-03-25

## 2020-03-25 ENCOUNTER — THERAPY VISIT (OUTPATIENT)
Dept: OCCUPATIONAL THERAPY | Facility: CLINIC | Age: 35
End: 2020-03-25
Payer: OTHER MISCELLANEOUS

## 2020-03-25 ENCOUNTER — OFFICE VISIT (OUTPATIENT)
Dept: ORTHOPEDICS | Facility: CLINIC | Age: 35
End: 2020-03-25
Payer: OTHER MISCELLANEOUS

## 2020-03-25 VITALS — HEIGHT: 73 IN | BODY MASS INDEX: 28.23 KG/M2 | WEIGHT: 213 LBS

## 2020-03-25 DIAGNOSIS — S52.132A CLOSED DISPLACED FRACTURE OF NECK OF LEFT RADIUS, INITIAL ENCOUNTER: Primary | ICD-10-CM

## 2020-03-25 DIAGNOSIS — M25.522 LEFT ELBOW PAIN: Primary | ICD-10-CM

## 2020-03-25 DIAGNOSIS — S52.032A CLOSED DISPLACED INTRA-ARTICULAR FRACTURE OF OLECRANON PROCESS OF LEFT ULNA, INITIAL ENCOUNTER: ICD-10-CM

## 2020-03-25 PROCEDURE — 97110 THERAPEUTIC EXERCISES: CPT | Mod: GO | Performed by: OCCUPATIONAL THERAPIST

## 2020-03-25 PROCEDURE — 97165 OT EVAL LOW COMPLEX 30 MIN: CPT | Mod: GO | Performed by: OCCUPATIONAL THERAPIST

## 2020-03-25 PROCEDURE — 97760 ORTHOTIC MGMT&TRAING 1ST ENC: CPT | Mod: GO | Performed by: OCCUPATIONAL THERAPIST

## 2020-03-25 ASSESSMENT — MIFFLIN-ST. JEOR: SCORE: 1955.04

## 2020-03-25 NOTE — NURSING NOTE
"Reason For Visit:   Chief Complaint   Patient presents with     Consult     Communited fracture of left radial head/neck, displaced midly comminuted fracture of the coronoid,  DOI: 3/9/2020       PCP: No Ref-Primary, Physician      ? No  Occupation  for [employer] .  Currently working? No.  Work status? On medical leave  Date of injury: 3/9/20  Type of injury: WC. Working on a 8 foot wall and fell   Date of surgery: None  Smoker: Yes    Right  hand dominant    SANE score    Not assessed due to it being his elbow not shoulder     Ht 1.854 m (6' 1\")   Wt 96.6 kg (213 lb)   BMI 28.10 kg/m        Pain Assessment  Patient Currently in Pain: Yes  0-10 Pain Scale: 1  Primary Pain Location: Elbow  Pain Descriptors: Discomfort    Ofelia Briceno LPN      "

## 2020-03-25 NOTE — PROGRESS NOTES
CHIEF CONCERN:  Left elbow fracture    HISTORY OF PRESENT ILLNESS:  Art is a 35 year old gentleman who fell from 8 feet from a concrete wall on 3/9/2020. He was seen in the [place] ED and radiographs demonstrated a left elbow fracture. In followup with the [facility] (Ortho) he was found to have a coronoid and radial head fracture. These were imaged further with CT. Patient has been in a posterior slab splint since the date of his injury. Denies numbness or tingling. Does not have any sense of instability.     Past Medical History:   Diagnosis Date     Back pain        No past surgical history on file.    Current Outpatient Medications   Medication Sig Dispense Refill     Acetaminophen (TYLENOL PO) Take  by mouth.         ibuprofen (ADVIL,MOTRIN) 200 MG tablet Take 3 tablets (600 mg) by mouth every 8 hours as needed for pain 30 tablet 0     Nutritional Supplements (MELATONIN PO) Take  by mouth.              Allergies   Allergen Reactions     Norco [Hydrocodone-Acetaminophen] Other (See Comments)     Very sedated, unable to function        SOCIAL HISTORY:    Social History     Socioeconomic History     Marital status:      Spouse name: Not on file     Number of children: Not on file     Years of education: Not on file     Highest education level: Not on file   Occupational History     Not on file   Social Needs     Financial resource strain: Not on file     Food insecurity     Worry: Not on file     Inability: Not on file     Transportation needs     Medical: Not on file     Non-medical: Not on file   Tobacco Use     Smoking status: Current Every Day Smoker     Packs/day: 0.50     Smokeless tobacco: Never Used   Substance and Sexual Activity     Alcohol use: Yes     Alcohol/week: 20.0 standard drinks     Types: 24 Cans of beer per week     Comment: occ     Drug use: No     Sexual activity: Yes     Partners: Female   Lifestyle     Physical activity     Days per week: Not on file     Minutes per  session: Not on file     Stress: Not on file   Relationships     Social connections     Talks on phone: Not on file     Gets together: Not on file     Attends Mu-ism service: Not on file     Active member of club or organization: Not on file     Attends meetings of clubs or organizations: Not on file     Relationship status: Not on file     Intimate partner violence     Fear of current or ex partner: Not on file     Emotionally abused: Not on file     Physically abused: Not on file     Forced sexual activity: Not on file   Other Topics Concern     Not on file   Social History Narrative     Not on file       FAMILY HISTORY: Reviewed in EMR      REVIEW OF SYSTEMS: Positive for that noted in past medical history and history of present illness and otherwise reviewed in EMR     PHYSICAL EXAM:    Adult male in no acute distress. Articulates and communicates with normal affect.  Respirations even and unlabored  Focused upper extremity exam: Splint removed from LUE. Band-aid removed from posterior elbow - has healing abrasion. No laceration. No erythema. Expected ecchymosis. Tolerates flexion to 105 at least. Extension to approx 40 degrees short of full extension. Demonstrates almost full pronation. Supination to near 20 degrees - limited by discomfort and sense of stiffness. Sens intact to Med/Rad/Uln nerves. Motor intact to EPL,FPL, and Intrinsics.     IMAGING:  Left elbow CT dated 3/19/2020 was reviewed and I agree with the description from Radiology below:  FINDINGS: Comminuted fracture of the radial head. There is 9 mm of impaction anteriorly and 4 mm of impaction medially. At the articular surface there is 1 mm of maximal depression and 1 mm of maximal  distraction. There is extension to the radial neck. Mildly comminuted fracture of the coronoid with 4 mm of maximal displacement and 4 mm of maximal distraction. Hemarthrosis. There appear to be at least 2, and  possibly 3, tiny intra-articular bony fragments. These  "are seen on series 8 images 30, 25, 14. Subcutaneous contusion versus edema.    Left elbow XR 3/16/2020 demonstrates the radial head/neck fracture and suggests the coronoid fracture seen best on CT.    ASSESSMENT:    1. Left radial head fracture, minimally displaced  2. Left coronoid tip fracture     PLAN:  I discussed that I would treat these fractures nonoperatively given the minimal displacement. I would not anticipate a high risk for instability given the size of the coronoid fracture and would focus instead on regaining motion. The intra-articular fragments are incredibly small (Radiology states \"tiny\") and surgery is not indicated for these. We will initiate elbow ROM with Hand therapy today. They will craft him a posterior slab splint to wear when not doing his elbow ROM.   He should have follow up in approx 2-3 weeks to recheck his ROM. This could be with Bony Brown if patient is progressing well.   He may do no work with the Left arm at this time. In 2-3 weeks follow up he may be able to do light push/pull/carry no more than 5 pounds if pain and ROM allow (depends on eval at that time). Would then progress activity gradually thereafter as motion, healing, and strength improves.    Marleny Zamudio MD    "

## 2020-03-25 NOTE — PROGRESS NOTES
Hand Therapy Initial Evaluation    Current Date:  3/25/2020    Diagnosis: Comminuted fracture of left radial head/neck, displaced midly comminuted fracture of the coronoid  DOI: 3/9/2020  Post:  2w 2d    Precautions: 3/25/20  Per MD: initiate elbow ROM with Hand therapy today, posterior slab splint to wear when not doing his elbow ROM, no work with the Left arm at this time.    Plan per MD:  He should have follow up in approx 2-3 weeks to recheck his ROM.   In 2-3 weeks follow up he may be able to do light push/pull/carry no more than 5 pounds if pain and ROM allow (depends on eval at that time).     Subjective:  Art Reece is a 35 year old male.    Patient reports symptoms of the right elbow which occurred due to injury at work. Since onset symptoms are Gradually getting better.  General health as reported by patient is excellent.  Pertinent medical history includes:Smoking  Medical allergies:none.  Surgical history: none.  Medication history: ibuprofen.    Current occupation is   Job Tasks: Driving, Lifting, Carrying, Operating a Machine, Assembly, Prolonged Sitting, Prolonged Standing, Pushing, Pulling, Repetitive Tasks    Occupational Profile Information:  Right hand dominant  Prior functional level:  no limitations  Patient reports symptoms of pain, stiffness/loss of motion, weakness/loss of strength and edema  Special tests:  x-ray and CT.    Previous treatment: half cast  Barriers include:none  Mobility: No difficulty  Transportation: drives  Currently not using L arm at work per MD    Functional Outcome Measure:   Upper Extremity Functional Index Score:  SCORE:   Column Totals: /80: 32   (A lower score indicates greater disability.)    Objective:  Pain Level (Scale 0-10)   3/25/2020   At Rest 1   With Use 1     Pain Description  Date 3/25/2020   Location elbow   Pain Quality Aching   Frequency intermittent     Pain is worst  daytime   Exacerbated by  motion   Relieved by rest    Progression improving     Edema  Mild, of the L elbow, FA    Sensation   WNL throughout all nerve distributions; per patient report    ROM  Pain Report: - none  + mild    ++ moderate    +++ severe   Elbow 3/25/2020 3/25/2020   AROM (PROM) R L   Extension WNL 48   Flexion    Supination WNL 40   Pronation WNL 53     ROM  Wrist AROM is WFL  Shoulder: WFL. A little stiff per pt    Strength  Testing deferred    Assessment:  Patient presents with symptoms consistent with diagnosis of left elbow  Communited fracture of left radial head/neck, displaced midly comminuted fracture of the coronoid,  with conservative intervention.     Patient's limitations or Problem List includes:  Pain, Decreased ROM/motion and Increased edema of the left elbow which interferes with the patient's ability to perform Self Care Tasks (dressing), Work Tasks, Recreational Activities, Household Chores and Driving  as compared to previous level of function.    Rehab Potential:  Excellent - Return to full activity, no limitations    Patient will benefit from skilled Occupational Therapy to increase ROM and decrease pain and edema to return to previous activity level and resume normal daily tasks and to reach their rehab potential.    Barriers to Learning:  No barrier    Communication Issues:  Patient appears to be able to clearly communicate and understand verbal and written communication and follow directions correctly.    Chart Review: Brief history including review of medical and/or therapy records relating to the presenting problem and Simple history review with patient    Identified Performance Deficits: dressing, care of others, home establishment and management, meal preparation and cleanup, work and leisure activities    Assessment of Occupational Performance:  3-5 Performance Deficits    Clinical Decision Making (Complexity): Low complexity    Treatment Explanation:  The following has been discussed with the patient:  RX  ordered/plan of care  Anticipated outcomes  Possible risks and side effects    Plan:  Frequency:  1 X week, once daily  Duration:  for 2 weeks tapering to 2 X a month over 8 weeks    Treatment Plan:   Modalities:  Icing at home  Therapeutic Exercise:  AROM, AAROM, PROM, Isotonics, Isometrics, Stabilization (when indicated, as progression of healing allows)  Neuromuscular re-education:  Proprioceptive Training  Manual Techniques:  Myofascial release and Manual edema mobilization  Orthotic Fabrication:  Static orthosis and long arm orthosis  Self Care:  Self Care Tasks, Ergonomic Considerations and Work Tasks    Discharge Plan:  Achieve all LTG.  Independent in home treatment program.  Reach maximal therapeutic benefit.    Home Program / Next visit / Plan: See flowsheet

## 2020-03-25 NOTE — LETTER
3/25/2020       RE: Art Reece  [address] [facility] [address]     Dear Colleague,    Thank you for referring your patient, Art Reece, to the [facility] at [facility]. Please see a copy of my visit note below.    CHIEF CONCERN:  Left elbow fracture    HISTORY OF PRESENT ILLNESS:  Art is a 35 year old gentleman who fell from 8 feet from a concrete wall on 3/9/2020. He was seen in the [facility] and radiographs demonstrated a left elbow fracture. In followup with the [facility] (Ortho) he was found to have a coronoid and radial head fracture. These were imaged further with CT. Patient has been in a posterior slab splint since the date of his injury. Denies numbness or tingling. Does not have any sense of instability.     Past Medical History:   Diagnosis Date     Back pain        No past surgical history on file.    Current Outpatient Medications   Medication Sig Dispense Refill     Acetaminophen (TYLENOL PO) Take  by mouth.         ibuprofen (ADVIL,MOTRIN) 200 MG tablet Take 3 tablets (600 mg) by mouth every 8 hours as needed for pain 30 tablet 0     Nutritional Supplements (MELATONIN PO) Take  by mouth.              Allergies   Allergen Reactions     Norco [Hydrocodone-Acetaminophen] Other (See Comments)     Very sedated, unable to function        SOCIAL HISTORY:    Social History     Socioeconomic History     Marital status:      Spouse name: Not on file     Number of children: Not on file     Years of education: Not on file     Highest education level: Not on file   Occupational History     Not on file   Social Needs     Financial resource strain: Not on file     Food insecurity     Worry: Not on file     Inability: Not on file     Transportation needs     Medical: Not on file     Non-medical: Not on file   Tobacco Use     Smoking status: Current Every Day Smoker     Packs/day: 0.50     Smokeless tobacco: Never Used    Substance and Sexual Activity     Alcohol use: Yes     Alcohol/week: 20.0 standard drinks     Types: 24 Cans of beer per week     Comment: occ     Drug use: No     Sexual activity: Yes     Partners: Female   Lifestyle     Physical activity     Days per week: Not on file     Minutes per session: Not on file     Stress: Not on file   Relationships     Social connections     Talks on phone: Not on file     Gets together: Not on file     Attends Sikhism service: Not on file     Active member of club or organization: Not on file     Attends meetings of clubs or organizations: Not on file     Relationship status: Not on file     Intimate partner violence     Fear of current or ex partner: Not on file     Emotionally abused: Not on file     Physically abused: Not on file     Forced sexual activity: Not on file   Other Topics Concern     Not on file   Social History Narrative     Not on file       FAMILY HISTORY: Reviewed in EMR      REVIEW OF SYSTEMS: Positive for that noted in past medical history and history of present illness and otherwise reviewed in EMR     PHYSICAL EXAM:    Adult male in no acute distress. Articulates and communicates with normal affect.  Respirations even and unlabored  Focused upper extremity exam: Splint removed from LUE. Band-aid removed from posterior elbow - has healing abrasion. No laceration. No erythema. Expected ecchymosis. Tolerates flexion to 105 at least. Extension to approx 40 degrees short of full extension. Demonstrates almost full pronation. Supination to near 20 degrees - limited by discomfort and sense of stiffness. Sens intact to Med/Rad/Uln nerves. Motor intact to EPL,FPL, and Intrinsics.     IMAGING:  Left elbow CT dated 3/19/2020 was reviewed and I agree with the description from Radiology below:  FINDINGS: Comminuted fracture of the radial head. There is 9 mm of impaction anteriorly and 4 mm of impaction medially. At the articular surface there is 1 mm of maximal  "depression and 1 mm of maximal  distraction. There is extension to the radial neck. Mildly comminuted fracture of the coronoid with 4 mm of maximal displacement and 4 mm of maximal distraction. Hemarthrosis. There appear to be at least 2, and  possibly 3, tiny intra-articular bony fragments. These are seen on series 8 images 30, 25, 14. Subcutaneous contusion versus edema.    Left elbow XR 3/16/2020 demonstrates the radial head/neck fracture and suggests the coronoid fracture seen best on CT.    ASSESSMENT:    1. Left radial head fracture, minimally displaced  2. Left coronoid tip fracture     PLAN:  I discussed that I would treat these fractures nonoperatively given the minimal displacement. I would not anticipate a high risk for instability given the size of the coronoid fracture and would focus instead on regaining motion. The intra-articular fragments are incredibly small (Radiology states \"tiny\") and surgery is not indicated for these. We will initiate elbow ROM with Hand therapy today. They will craft him a posterior slab splint to wear when not doing his elbow ROM.   He should have follow up in approx 2-3 weeks to recheck his ROM. This could be with Bony Brown if patient is progressing well.   He may do no work with the Left arm at this time. In 2-3 weeks follow up he may be able to do light push/pull/carry no more than 5 pounds if pain and ROM allow (depends on eval at that time). Would then progress activity gradually thereafter as motion, healing, and strength improves.    Marleny Zamudio MD      Again, thank you for allowing me to participate in the care of your patient.      Sincerely,    Marleny Zamudio MD      "

## 2020-03-25 NOTE — LETTER
2020     Seen today: yes    Patient:  Art Reece  :   1985    Physician: MARLENY ZAMUDIO may return to work with restrictions..      Patient limitations:  No pushing, pulling, lifting, carrying with left arm for 2 weeks.  Advancement of activities with depend on therapy.          Electronically signed by Marleny Zamudio MD

## 2020-03-25 NOTE — LETTER
Date:March 26, 2020      Patient was self referred, no letter generated. Do not send.        [facility] Information

## 2020-03-30 ENCOUNTER — TELEPHONE (OUTPATIENT)
Dept: ORTHOPEDICS | Facility: OTHER | Age: 35
End: 2020-03-30

## 2020-03-30 NOTE — TELEPHONE ENCOUNTER
Phone call to patient to gather more information regarding his request for the appointment.   Patient was seen with Marleny Zamudio MD on 03/25 down at the [facility]. They had taken the patient out of the splint and put him in a new one as well as having him go to PT.   Per her note he should have follow up in approx 2-3 weeks to recheck his ROM. She states that this appointment can be with Bony Brown due to location if patient is progressing well.     Patient is wondering if he could have an appointment next week for the follow up.   Routing to provider for recommendations.   Radha Arnold on 3/30/2020 at 9:20 AM

## 2020-03-30 NOTE — TELEPHONE ENCOUNTER
Reason for Call:  Other call back    Detailed comments: Pt would like an apt with Kevin. Please call him and let him know what you want to do. Tel apt? See him? or wait?     Phone Number Patient can be reached at: Cell number on file:    Telephone Information:   Mobile [phone]       Best Time: NA    Can we leave a detailed message on this number? Not Applicable    Call taken on 3/30/2020 at 8:39 AM by Krystina Valentin

## 2020-03-30 NOTE — TELEPHONE ENCOUNTER
Patient is scheduled for both of the appointments.   Closing encounter.   Radha Arnold on 3/30/2020 at 9:40 AM

## 2020-03-30 NOTE — TELEPHONE ENCOUNTER
Perfect.  I have 2 days in the below note when I would like to see him in my clinic.  It should be around those 2 dates.  Thanks.

## 2020-03-30 NOTE — TELEPHONE ENCOUNTER
I was just reviewing this patient's chart and information from Marleny Zamudio MD.  I see he just called him.  I am going to give him a call.    He was in the shower, I was able to talk to his wife.  I would like the patient to follow-up on or close to April 8 because at that point we would increase his weightbearing and get x-ray.  Then I would like to follow him up again somewhere close to April 22 because that is when we would get his splint off and discontinue it and we could get x-rays then also.    I will try to touch base with JOSE CARLOS Garcia since she talked to him already this morning.

## 2020-04-08 NOTE — PROGRESS NOTES
Sports Medicine Clinic Visit - Interim History April 8, 2020      PCP: No Ref-Primary, Physician    Art Reece is a 35 year old male who is seen in follow up for a left elbow fracture.  Since last visit on 3/25/2020 with Dr. Zmaudio patient has been wearing the posterior splint at work and taking it off at other times. He is getting soreness with use of the elbow.  He rates the pain at a 1/10 currently.  Symptoms are relieved with ice and splinting.  Symptoms are worsened by using the elbow. He is able to hold a coffee cup however not for very long.  He has some supination however he is still having trouble with turning handles and supinating the forearm.     - Now ~ 1 month from initial injury      Review of Systems  Musculoskeletal: as above  Remainder of review of systems is negative including constitutional, eyes, ENT, CV, pulmonary, GI, , endocrine, skin, hematologic, and neurologic except as noted in HPI or medical history.    History reviewed. No pertinent past surgical/medical/family/social history other than as mentioned in HPI.    Patient Active Problem List   Diagnosis     Left elbow pain     Past Medical History:   Diagnosis Date     Back pain      No past surgical history on file.  No family history on file.  Social History     Socioeconomic History     Marital status:      Spouse name: Not on file     Number of children: Not on file     Years of education: Not on file     Highest education level: Not on file   Occupational History     Not on file   Social Needs     Financial resource strain: Not on file     Food insecurity     Worry: Not on file     Inability: Not on file     Transportation needs     Medical: Not on file     Non-medical: Not on file   Tobacco Use     Smoking status: Current Every Day Smoker     Packs/day: 0.50     Smokeless tobacco: Never Used   Substance and Sexual Activity     Alcohol use: Yes     Alcohol/week: 20.0 standard drinks     Types: 24 Cans of beer per week  "    Comment: occ     Drug use: No     Sexual activity: Yes     Partners: Female   Lifestyle     Physical activity     Days per week: Not on file     Minutes per session: Not on file     Stress: Not on file   Relationships     Social connections     Talks on phone: Not on file     Gets together: Not on file     Attends Orthodoxy service: Not on file     Active member of club or organization: Not on file     Attends meetings of clubs or organizations: Not on file     Relationship status: Not on file     Intimate partner violence     Fear of current or ex partner: Not on file     Emotionally abused: Not on file     Physically abused: Not on file     Forced sexual activity: Not on file   Other Topics Concern     Not on file   Social History Narrative     Not on file       He works at [employer]    Current Outpatient Medications   Medication     Acetaminophen (TYLENOL PO)     ibuprofen (ADVIL,MOTRIN) 200 MG tablet     Nutritional Supplements (MELATONIN PO)     No current facility-administered medications for this visit.      No Active Allergies      Objective:  /78   Pulse 79   Ht 1.854 m (6' 1\")   Wt 96.6 kg (213 lb)   BMI 28.10 kg/m      General: Alert and in no distress    Head: Normocephalic, atraumatic  Eyes: no scleral icterus or conjunctival erythema   Oropharynx:  Mucous membranes moist  Skin: no erythema, petechiae, or jaundice  CV: regular rhythm by palpation, 2+ distal pulses  Resp: normal respiratory effort without conversational dyspnea   Psych: normal mood and affect    Neuro: Motor strength and sensation as noted below    Musculoskeletal:  -Tenderness posterior/anterior left elbow.  -Decreased left elbow flexion/extension, but does not cause significant pain.  Decreased left forearm supination.  -Sensation intact bilateral upper extremities.      Radiology:  Independent visualization of images performed  Recent Results (from the past 744 hour(s))   XR Elbow Left G/E 3 Views    " Narrative    LEFT ELBOW THREE OR MORE VIEWS  3/16/2020 10:12 AM     HISTORY: Left elbow pain.      Impression    IMPRESSION: Radial head/neck fracture. No radiographically apparent  radial head articular surface depression. However, CT would be more  sensitive in this regard. On the lateral view, coronoid process  fracture cannot be excluded as well.    SHERYL CAMACHO MD   CT Elbow Left w/o Contrast    Narrative    CT ELBOW LEFT WITHOUT CONTRAST 3/19/2020 11:03 AM     HISTORY: Elbow trauma, fracture suspected, initial exam. We are  specifically looking for fracture fragments in the joint. Deciding  surgery versus non-op. Closed nondisplaced fracture of neck of left  radius, initial encounter. Closed fracture of nasal bone, initial  encounter.     TECHNIQUE: Axial images with reconstructions. Radiation dose for this  scan was reduced using automated exposure control, adjustment of the  mA and/or kV according to patient size, or iterative reconstruction  technique.     FINDINGS: Comminuted fracture of the radial head. There is 9 mm of  impaction anteriorly and 4 mm of impaction medially. At the articular  surface there is 1 mm of maximal depression and 1 mm of maximal  distraction. There is extension to the radial neck. Mildly comminuted  fracture of the coronoid with 4 mm of maximal displacement and 4 mm of  maximal distraction. Hemarthrosis. There appear to be at least 2, and  possibly 3, tiny intra-articular bony fragments. These are seen on  series 8 images 30, 25, 14. Subcutaneous contusion versus edema.      Impression    IMPRESSION:  1. Comminuted fracture of the radial head and neck, as above.  2. Mildly comminuted fracture of the coronoid, with  displacement/distraction.  3. Tiny intra-articular fragments.    ANNABELLE POLLARD MD       Assessment:  1. Closed displaced fracture of head of left radius with routine healing, subsequent encounter    2. Closed displaced fracture of coronoid process of left ulna with  routine healing, subsequent encounter        Plan:  Discussed the assessment with the patient and developed a plan together:  -Continue posterior splint.  Continue elbow and forearm ROM.  -New work letter provided.  No pushing, pulling, lifting, carrying with left arm.  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    -Follow up with MARCIA Daley on 4/28/2020.       Beverley Aguilar MD, CAQ Sports Medicine  [facility]

## 2020-04-09 ENCOUNTER — OFFICE VISIT (OUTPATIENT)
Dept: ORTHOPEDICS | Facility: CLINIC | Age: 35
End: 2020-04-09
Payer: OTHER MISCELLANEOUS

## 2020-04-09 VITALS
DIASTOLIC BLOOD PRESSURE: 78 MMHG | HEART RATE: 79 BPM | SYSTOLIC BLOOD PRESSURE: 117 MMHG | BODY MASS INDEX: 28.23 KG/M2 | HEIGHT: 73 IN | WEIGHT: 213 LBS

## 2020-04-09 DIAGNOSIS — S52.042D CLOSED DISPLACED FRACTURE OF CORONOID PROCESS OF LEFT ULNA WITH ROUTINE HEALING, SUBSEQUENT ENCOUNTER: ICD-10-CM

## 2020-04-09 DIAGNOSIS — S52.122D CLOSED DISPLACED FRACTURE OF HEAD OF LEFT RADIUS WITH ROUTINE HEALING, SUBSEQUENT ENCOUNTER: Primary | ICD-10-CM

## 2020-04-09 PROCEDURE — 99203 OFFICE O/P NEW LOW 30 MIN: CPT | Performed by: PHYSICAL MEDICINE & REHABILITATION

## 2020-04-09 ASSESSMENT — MIFFLIN-ST. JEOR: SCORE: 1955.04

## 2020-04-09 NOTE — PATIENT INSTRUCTIONS
-Continue posterior splint.  Continue elbow and forearm ROM.    -New work letter provided.      -Follow up with MARCIA Daley on 4/28/2020.

## 2020-04-09 NOTE — LETTER
4/9/2020         RE: Art Reece  [address] [facility] [address]        Dear Colleague,    Thank you for referring your patient, Art Reece, to the [facility]. Please see a copy of my visit note below.    Sports Medicine Clinic Visit - Interim History April 8, 2020      PCP: No Ref-Primary, Physician    Art Reece is a 35 year old male who is seen in follow up for a left elbow fracture.  Since last visit on 3/25/2020 with Dr. Zamudio patient has been wearing the posterior splint at work and taking it off at other times. He is getting soreness with use of the elbow.  He rates the pain at a 1/10 currently.  Symptoms are relieved with ice and splinting.  Symptoms are worsened by using the elbow. He is able to hold a coffee cup however not for very long.  He has some supination however he is still having trouble with turning handles and supinating the forearm.     - Now ~ 1 month from initial injury      Review of Systems  Musculoskeletal: as above  Remainder of review of systems is negative including constitutional, eyes, ENT, CV, pulmonary, GI, , endocrine, skin, hematologic, and neurologic except as noted in HPI or medical history.    History reviewed. No pertinent past surgical/medical/family/social history other than as mentioned in HPI.    Patient Active Problem List   Diagnosis     Left elbow pain     Past Medical History:   Diagnosis Date     Back pain      No past surgical history on file.  No family history on file.  Social History     Socioeconomic History     Marital status:      Spouse name: Not on file     Number of children: Not on file     Years of education: Not on file     Highest education level: Not on file   Occupational History     Not on file   Social Needs     Financial resource strain: Not on file     Food insecurity     Worry: Not on file     Inability: Not on file     Transportation needs     Medical: Not on file     Non-medical: Not on  "file   Tobacco Use     Smoking status: Current Every Day Smoker     Packs/day: 0.50     Smokeless tobacco: Never Used   Substance and Sexual Activity     Alcohol use: Yes     Alcohol/week: 20.0 standard drinks     Types: 24 Cans of beer per week     Comment: occ     Drug use: No     Sexual activity: Yes     Partners: Female   Lifestyle     Physical activity     Days per week: Not on file     Minutes per session: Not on file     Stress: Not on file   Relationships     Social connections     Talks on phone: Not on file     Gets together: Not on file     Attends Quaker service: Not on file     Active member of club or organization: Not on file     Attends meetings of clubs or organizations: Not on file     Relationship status: Not on file     Intimate partner violence     Fear of current or ex partner: Not on file     Emotionally abused: Not on file     Physically abused: Not on file     Forced sexual activity: Not on file   Other Topics Concern     Not on file   Social History Narrative     Not on file       He works at [employer]    Current Outpatient Medications   Medication     Acetaminophen (TYLENOL PO)     ibuprofen (ADVIL,MOTRIN) 200 MG tablet     Nutritional Supplements (MELATONIN PO)     No current facility-administered medications for this visit.      No Active Allergies      Objective:  /78   Pulse 79   Ht 1.854 m (6' 1\")   Wt 96.6 kg (213 lb)   BMI 28.10 kg/m      General: Alert and in no distress    Head: Normocephalic, atraumatic  Eyes: no scleral icterus or conjunctival erythema   Oropharynx:  Mucous membranes moist  Skin: no erythema, petechiae, or jaundice  CV: regular rhythm by palpation, 2+ distal pulses  Resp: normal respiratory effort without conversational dyspnea   Psych: normal mood and affect    Neuro: Motor strength and sensation as noted below    Musculoskeletal:  -Tenderness posterior/anterior left elbow.  -Decreased left elbow flexion/extension, but does not cause " significant pain.  Decreased left forearm supination.  -Sensation intact bilateral upper extremities.      Radiology:  Independent visualization of images performed  Recent Results (from the past 744 hour(s))   XR Elbow Left G/E 3 Views    Narrative    LEFT ELBOW THREE OR MORE VIEWS  3/16/2020 10:12 AM     HISTORY: Left elbow pain.      Impression    IMPRESSION: Radial head/neck fracture. No radiographically apparent  radial head articular surface depression. However, CT would be more  sensitive in this regard. On the lateral view, coronoid process  fracture cannot be excluded as well.    SHERYL CAMACHO MD   CT Elbow Left w/o Contrast    Narrative    CT ELBOW LEFT WITHOUT CONTRAST 3/19/2020 11:03 AM     HISTORY: Elbow trauma, fracture suspected, initial exam. We are  specifically looking for fracture fragments in the joint. Deciding  surgery versus non-op. Closed nondisplaced fracture of neck of left  radius, initial encounter. Closed fracture of nasal bone, initial  encounter.     TECHNIQUE: Axial images with reconstructions. Radiation dose for this  scan was reduced using automated exposure control, adjustment of the  mA and/or kV according to patient size, or iterative reconstruction  technique.     FINDINGS: Comminuted fracture of the radial head. There is 9 mm of  impaction anteriorly and 4 mm of impaction medially. At the articular  surface there is 1 mm of maximal depression and 1 mm of maximal  distraction. There is extension to the radial neck. Mildly comminuted  fracture of the coronoid with 4 mm of maximal displacement and 4 mm of  maximal distraction. Hemarthrosis. There appear to be at least 2, and  possibly 3, tiny intra-articular bony fragments. These are seen on  series 8 images 30, 25, 14. Subcutaneous contusion versus edema.      Impression    IMPRESSION:  1. Comminuted fracture of the radial head and neck, as above.  2. Mildly comminuted fracture of the coronoid,  with  displacement/distraction.  3. Tiny intra-articular fragments.    ANNABELLE POLLARD MD       Assessment:  1. Closed displaced fracture of head of left radius with routine healing, subsequent encounter    2. Closed displaced fracture of coronoid process of left ulna with routine healing, subsequent encounter        Plan:  Discussed the assessment with the patient and developed a plan together:  -Continue posterior splint.  Continue elbow and forearm ROM.  -New work letter provided.  No pushing, pulling, lifting, carrying with left arm.  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    -Follow up with MARCIA Daley on 4/28/2020.       Beverley Aguilar MD, [facility] Medicine  [facility]      Again, thank you for allowing me to participate in the care of your patient.        Sincerely,        Tanisha Aguilar MD

## 2020-04-09 NOTE — LETTER
April 9, 2020      Art Reece  [address] [facility] [address]        To Whom It May Concern:    Art Reece  was seen on 4/9/2020.   Art Reece may return to work with restrictions.    Patient limitations:  No pushing, pulling, lifting, carrying with left arm for 3 weeks.  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    He will follow up in 3 weeks.      Sincerely,        Tanisha Aguilar MD

## 2020-04-14 ENCOUNTER — VIRTUAL VISIT (OUTPATIENT)
Dept: OCCUPATIONAL THERAPY | Facility: CLINIC | Age: 35
End: 2020-04-14
Payer: OTHER MISCELLANEOUS

## 2020-04-14 DIAGNOSIS — M25.522 LEFT ELBOW PAIN: ICD-10-CM

## 2020-04-14 PROCEDURE — 97110 THERAPEUTIC EXERCISES: CPT | Mod: 95 | Performed by: OCCUPATIONAL THERAPIST

## 2020-04-14 NOTE — PROGRESS NOTES
"Occupational/Hand Therapy Virtual Follow Up Visit      The patient has been notified of following:     \"This virtual visit will be conducted between you and your provider. We have found that certain health care needs can be provided without the need for physical presence.  This service lets us provide the care you need with a virtual visit.\"    Due to external, as well as internal [facility] management of the COVID-19 Virus, Art Reece was not seen in our clinic.  As a substitution, we implemented a virtual visit to manage this patient's condition utilizing the [employer] virtual visit platform via the patient s existing code.  The provider, Maren Garcia, reviewed the patient's chart, PTRx prescription, and spoke with the patient to determine the following telemedicine visit is appropriate and effective for the patient's care.    The following type of visit was completed:   Video Visit:  The [employer] platform uses a synchronous HIPAA compliant video stream for this patient encounter.        S: Art Reece is a 35 year old male. Connected virtually on the [employer] platform to discuss their condition/progress. They noted improvements in Pain and range of motion with the elbow. 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 He reports that it has been going well, but his elbow gets sore. He is not wearing the splint at home unless he is doing aggravating activities or sleeping. They noted ongoing pain or limitations with pronation/supination and trying to extend the elbow for prolonged periods of time.       Pain Level (Scale 0-10):   4/14/2020   At Rest 0-1   With Use 1-3     Pain Description:  Date 4/14/2020   Location Left lateral elbow and radial wrist   Pain Quality Stiff, can be sharp with wrong motions but infrequent   Frequency intermittent or daily     Pain is worst daytime   Exacerbated by Use of the arm, pron/sup and extension "   Relieved by Rest, ibuprofen, wearing splint/sling   Progression improving     O: Patient demonstrated:  Elbow flexion and extension 10 reps  Forearm pronation/supination 10 reps  Wrist AROM - flex/ext and RD/UD 10 reps     ROM  Pain Report: - none  + mild    ++ moderate    +++ severe   Elbow 4/14/2020   AROM (PROM) Left *   Extension ~-30   Flexion ~110   Supination ~35   Pronation ~65   * Measures taken via goniometer through video, approximate    ROM  Pain Report: - none  + mild    ++ moderate    +++ severe   Wrist 4/14/2020   AROM (PROM) Left *   Extension ~65   Flexion ~50   RD NT   UD NT   *Measures taken via goniometer through video, approximate    Sensation: WNL    PTRx Content from today's visit:  Exercise Name: Icing, Notes: as needed for pain  Exercise Name: Elbow Active Range of Motion Flexion with neutral forearm - Reps: 10-15 - Sessions: 3  Exercise Name: Forearm Active Range of Motion Combined Pronation and Supination - Reps: 10 (each way) - Sessions: 3  Exercise Name: Wrist Active Range of Motion Extension/Flexion with Gravity Eliminated - Reps: 10 - Sessions: 3  Exercise Name: Active Range of Motion Combined Radial and Ulnar Deviation, Sets: 1 - Reps: 10 - Sessions: 3  Exercise Name: Wrist Active Range of Motion Extension and Flexion Combined, Sets: 1 - Reps: 10 - Sessions: 3  Exercise Name: Shoulder Scaption Full Can - Reps: 5-6 - Sessions: 3, Notes: with splint on  Exercise Name: Elbow Active Range of Motion Extension in Supination - Reps: 5 - Sessions: 3, Notes: Hold at gentle end range for 5-10 seconds, repeat 5 times    A:   Patient is ready to progress to more complex exercises.  Response to therapy has shown an improvement in  pain level    P: Patient will continue with the exercise program assigned on their PTRx code and will add the following measures to manage their pain/condition:   Gentle elbow extension     Current treatment program is being advanced to more complex exercises.    Home  Exercise Program:  Elbow AROM - all directions  Wrist AROM - all directions  Shoulder scaption - full can  Gentle prolonged elbow extension    Next Visit: Progress per MD    Virtual visit contact time    Time of service began: 4:20 PM  Time of service ended: 4:36 PM  Total Time for set up, visit, and documentation: 25 minutes    Payor: WORK COMP / Plan:  [employer] / Product Type: *No Product type* /   .  Procedure Code/s   Therapeutic Exercise (15727): 15 minutes    I have reviewed the note as documented above.  This accurately captures the substance of my conversation with the patient.  Provider location: [place], [place] ([facility])  Patient location: Home

## 2020-04-28 ENCOUNTER — OFFICE VISIT (OUTPATIENT)
Dept: ORTHOPEDICS | Facility: CLINIC | Age: 35
End: 2020-04-28
Payer: OTHER MISCELLANEOUS

## 2020-04-28 ENCOUNTER — ANCILLARY PROCEDURE (OUTPATIENT)
Dept: GENERAL RADIOLOGY | Facility: CLINIC | Age: 35
End: 2020-04-28
Attending: PHYSICIAN ASSISTANT
Payer: OTHER MISCELLANEOUS

## 2020-04-28 VITALS
WEIGHT: 213 LBS | BODY MASS INDEX: 28.23 KG/M2 | DIASTOLIC BLOOD PRESSURE: 80 MMHG | SYSTOLIC BLOOD PRESSURE: 122 MMHG | HEIGHT: 73 IN

## 2020-04-28 DIAGNOSIS — S52.132A CLOSED DISPLACED FRACTURE OF NECK OF LEFT RADIUS, INITIAL ENCOUNTER: ICD-10-CM

## 2020-04-28 DIAGNOSIS — S52.132D CLOSED DISPLACED FRACTURE OF NECK OF LEFT RADIUS WITH ROUTINE HEALING, SUBSEQUENT ENCOUNTER: Primary | ICD-10-CM

## 2020-04-28 DIAGNOSIS — S52.032A CLOSED DISPLACED INTRA-ARTICULAR FRACTURE OF OLECRANON PROCESS OF LEFT ULNA, INITIAL ENCOUNTER: ICD-10-CM

## 2020-04-28 DIAGNOSIS — S52.032D CLOSED DISPLACED INTRA-ARTICULAR FRACTURE OF OLECRANON PROCESS OF LEFT ULNA WITH ROUTINE HEALING, SUBSEQUENT ENCOUNTER: ICD-10-CM

## 2020-04-28 PROCEDURE — 99213 OFFICE O/P EST LOW 20 MIN: CPT | Performed by: PHYSICIAN ASSISTANT

## 2020-04-28 PROCEDURE — 73080 X-RAY EXAM OF ELBOW: CPT | Mod: TC

## 2020-04-28 ASSESSMENT — MIFFLIN-ST. JEOR: SCORE: 1955.04

## 2020-04-28 ASSESSMENT — PAIN SCALES - GENERAL: PAINLEVEL: NO PAIN (0)

## 2020-04-28 NOTE — LETTER
4/28/2020         RE: Art Reece  [address] [facility] [address]        Dear Colleague,    Thank you for referring your patient, Art Reece, to the [facility]. Please see a copy of my visit note below.    Office Visit-Fracture Follow up    Chief Complaint: Art Reece is a 35 year old male who is being seen for   Chief Complaint   Patient presents with     RECHECK     (7w1d) Left elbow fx, WORK COMP 3/9/20; XR L elbow 4/28/2020       History of Present Illness:   Mechanism of Injury: Patient had a fall off a wall.  Date of injury was 3/9/2020  Location: Left elbow  Duration of Pain: Since date of injury however the pain has gotten much better and he barely has pain now.  Rating of Pain: Minimal  Pain Quality: Achy and stiffness  Pain is better with: Rest  Pain is worse with: Heavy therapy  Treatment so far consists of: Posterior splint and work restrictions and range of motion.   Associated Features: Patient denies any numbness or tingling.  Patient does feel his range of motion is getting better.  Patient still has some stiffness.  Pain is Limiting: Activity that is full with the left upper extremity  Here to: Orthopedic follow-up and x-ray  Additional History: Patient is working and doing well at work.  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  Patient admits to not really wearing his posterior splint for a long time.  Patient feels he has lifted up to 10 to 15 pounds without any problems with the left upper extremity.  Patient is doing video physical therapy from the [facility].    REVIEW OF SYSTEMS  General: negative for, night sweats, dizziness, fatigue  Resp: No shortness of breath and no cough  CV: negative for chest pain, syncope or near-syncope  GI: negative for nausea, vomiting and diarrhea  : negative for dysuria and hematuria  Musculoskeletal: as  "above  Neurologic: negative for syncope   Hematologic: negative for bleeding disorder    Physical Exam:  Vitals: /80 (BP Location: Right arm, Cuff Size: Adult Regular)   Ht 1.854 m (6' 1\")   Wt 96.6 kg (213 lb)   BMI 28.10 kg/m    BMI= Body mass index is 28.1 kg/m .  Constitutional: healthy, alert and no acute distress   Psychiatric: mentation appears normal and affect normal/bright  NEURO: no focal deficits, CMS intact left upper extremity   RESP: Normal with easy respirations and no use of accessory muscles to breathe, no audible wheezing or retractions  CV: +2 radial pulse and his hand is warm to palpation.   SKIN: No erythema, rashes, excoriation, or breakdown. No evidence of infection.   MUSCULOSKELETAL:    INSPECTION of left elbow: No gross deformities, erythema, edema, ecchymosis, atrophy or fasciculations.     PALPATION: Tenderness is noted on the radial head.  No tenderness medial or lateral epicondyle or the olecranon or in the cubital fossa.  No tenderness forearm, wrist or upper arm.  No increased warmth.    ROM: Passive: Elbow flexion 115 degrees, elbow extension approximately 20 degrees short of full , supination 40 degrees, pronation 40 degrees. The range of motion is without catching locking or pain except for supination pronation.        STRENGTH: 5 out of 5 , interosseous and thumb without pain. 5 out of 5 biceps and triceps    SPECIAL TEST:  none 1.  GAIT: non-antalgic  Lymph: no palpable lymph nodes      Diagnostic Modalities:  Recent Results (from the past 744 hour(s))   XR Elbow Left G/E 3 Views    Narrative    LEFT ELBOW THREE OR MORE VIEWS 4/28/2020 2:47 PM     HISTORY: Closed displaced fracture of neck of left radius, initial  encounter. Closed displaced intra-articular fracture of olecranon  process of left ulna, initial encounter.      Impression    IMPRESSION: Left radius neck nondisplaced fracture, alignment  unchanged from 3/16/2020.    SHERYL CAMACHO MD     I agree with " the above read.  On the x-rays today I do believe there is mineralization around the radial neck however I do feel there is an area that is not completely healed yet.  There is no other fracture dislocation or tumor.    Independent visualization of the images was performed.      Impression: 1. 1  Month and 20 days status post left elbow radial head and neck intra-articular fracture as well as olecranon process.  2.  1 month and 20 days status post nasal fracture    Plan:  All of the above pertinent physical exam and imaging modalities findings was reviewed with Art.                                          CONSERVATIVE CARE:    Patient Instructions:   1. Xrays:  On x-ray your fracture looks good and I can see some healing however there is an area on the radial neck is not completely healed yet.  2. Anticoagulation: Not needed for these fractures.  Minimal risk of clot  3. Pain Medication: you are not having much for pain and that is excellent.  Take Tylenol if you need to.  Best thing is to take it when you are doing your therapy.  4. Weight Bearing:  Okay to gradually increase some of your weightbearing now.  You can lift up to 10 poundsAnd then progress from there but listen to your body if you are having some pain then back down  5. Activity/Therapy:  Continue to push your range of motion I really want you to make progress.  Flexion extension supination pronation which is rotating your wrist are the main ones.  Continue to work with OT at the [facility].  I will put in a new order that allows you to progress your range of motion but we will hold on strengthening probably for another 2 weeks.  6. Cast/Splint/Brace/Sling:  Discontinue your posterior splint.  Sounds like you are not really wearing it all that much anyways.    7. Work: This is a Workmen's Comp. injury.    We wrote you a work note that stated okay to push pull lift and carry up to 10 pounds of the left upper extremity now.  Okay to do  steps but not ladders.  This will be for the next 2 weeks and then we will reassess.    8.  Nasal fracture: healed  9. Plan: Most likely the smoking is holding down your healing.  We will see her 1 more time in 2 weeks for an x-ray and then after that we can most likely follow you via telephone.  If x-rays look good next week we can include strengthening.  Continue to work on that range of motion.  Follow-up:  Follow up with Alejandro Brown PA-C in 2 weeks   Re-x-ray on return: Yes we will get AP lateral and oblique view of the left elbow     BP Readings from Last 1 Encounters:   04/28/20 122/80       BP noted to be well controlled today in office.      Patient does use Tobacco products. Patient not ready to quit at this time.  Strongly encouraged smoking cessation.  Risks of smoking and benefits of quitting were discussed.  Information offered: AVS with information about stopping smoking and advised to discuss smoking cessation medications/strategies with Primary care provider.  3-5 Minutes were spent in counseling.    This note was dictated with [employer].    Alejandro Brwon PA-C                Again, thank you for allowing me to participate in the care of your patient.        Sincerely,        Alejandro Brown PA-C

## 2020-04-28 NOTE — LETTER
April 28, 2020        Art Reece  [address] [facility] [address]          To whom it may concern:    RE: Art Cordova was seen in our clinic today. Art Reece may return to work with restrictions.    Patient limitations:  May begin pushing, pulling, lifting, carrying up to 10lbs with left arm for 2 weeks. Ok to do steps but No ladders.    He will follow up in 2 weeks.      Please contact me for questions or concerns.      Sincerely,        Alejandro Brown PA-C

## 2020-04-28 NOTE — PATIENT INSTRUCTIONS
Encounter Diagnoses   Name Primary?     Closed displaced fracture of neck of left radius with routine healing, subsequent encounter Yes     Closed displaced intra-articular fracture of olecranon process of left ulna with routine healing, subsequent encounter       PLAN:    1. Xrays:  On x-ray your fracture looks good and I can see some healing however there is an area on the radial neck is not completely healed yet.  2. Anticoagulation: Not needed for these fractures.  Minimal risk of clot  3. Pain Medication: you are not having much for pain and that is excellent.  Take Tylenol if you need to.  Best thing is to take it when you are doing your therapy.  4. Weight Bearing:  Okay to gradually increase some of your weightbearing now.  You can lift up to 10 poundsAnd then progress from there but listen to your body if you are having some pain then back down  5. Activity/Therapy:  Continue to push your range of motion I really want you to make progress.  Flexion extension supination pronation which is rotating your wrist are the main ones.  Continue to work with OT at the [facility].  I will put in a new order that allows you to progress your range of motion but we will hold on strengthening probably for another 2 weeks.  6. Cast/Splint/Brace/Sling:  Discontinue your posterior splint.  Sounds like you are not really wearing it all that much anyways.    7. Work: This is a Workmen's Comp. injury.    We wrote you a work note that stated okay to push pull lift and carry up to 10 pounds of the left upper extremity now.  Okay to do steps but not ladders.  This will be for the next 2 weeks and then we will reassess.    8.  Nasal fracture: healed  9. Plan: Most likely the smoking is holding down your healing.  We will see her 1 more time in 2 weeks for an x-ray and then after that we can most likely follow you via telephone.  If x-rays look good next week we can include strengthening.  Continue to work on that range  of motion.  Follow-up:  Follow up with Alejandro Brown PA-C in 2 weeks

## 2020-05-06 ENCOUNTER — VIRTUAL VISIT (OUTPATIENT)
Dept: OCCUPATIONAL THERAPY | Facility: CLINIC | Age: 35
End: 2020-05-06
Payer: OTHER MISCELLANEOUS

## 2020-05-06 DIAGNOSIS — M25.522 LEFT ELBOW PAIN: ICD-10-CM

## 2020-05-06 PROCEDURE — 97110 THERAPEUTIC EXERCISES: CPT | Mod: 95 | Performed by: OCCUPATIONAL THERAPIST

## 2020-05-06 NOTE — PROGRESS NOTES
"Occupational/Hand Therapy Virtual Follow Up Visit    The patient has been notified of following:     \"This virtual visit will be conducted between you and your provider. We have found that certain health care needs can be provided without the need for physical presence.  This service lets us provide the care you need with a virtual visit.\"    Due to external, as well as internal [facility] management of the COVID-19 Virus, Art Reece was not seen in our clinic.  As a substitution, we implemented a virtual visit to manage this patient's condition utilizing the PTRx virtual visit platform via the patient s existing code.  The provider, Roselyn Argueta, reviewed the patient's chart, PTRx prescription, and spoke with the patient to determine the following telemedicine visit is appropriate and effective for the patient's care.    The following type of visit was completed:   Video Visit:  The PTRx platform uses a synchronous HIPAA compliant video stream for this patient encounter.        S: Art Reece is a 35 year old male. Connected virtually on the [employer] platform to discuss their condition/progress. 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  I am able to lift anything less than 10# unless it hurt here (MEP and some along the top).      O: Patient demonstrated Elbow flexion ~125, elbow extension -20, pro 60, sup 45.  Pt was unaware his order stated 2x/week and that it stated in person visits preferred.  Pt to see MD next week for x-ray and will then f/u with hand therapy.     PTRx Content from today's visit:  Exercise Name: Elbow Passive Range of Motion Flexion, Sets: 1 - Reps: 5 - Sessions: 4, Notes: Hold for 15-20 seconds    Exercise Name: Elbow Passive Range of Motion Extension at Edge of Table, Sets: 1 - Reps: 5 - Sessions: 4, Notes: can hold soup can, hold for 15-20 seconds    Exercise Name: Elbow Passive Range of Motion Supination, Sets: 1 - Reps: 5 - Sessions: 4, Notes: Hold for 15-20 sec    Exercise " Name: Forearm Passive Range of Motion Pronation, Sets: 1 - Reps: 5 - Sessions: 4, Notes: Hold for 15-20 sec    A:   Patient's symptoms are resolving.  Patient is progressing as expected.  Patient is becoming more independent in home exercise program  Response to therapy has shown an improvement in  pain level and ROM   Minimal progress in elbow extension per visual observation during video visit.    P: Patient will continue with the exercise program assigned on their PTRx code and will add the following measures to manage their pain/condition: see above.     Current treatment program is being advanced to more complex exercises.      Virtual visit contact time    Time of service began: 3:50 PM  Time of service ended: 4:20 PM  Total Time for set up, visit, and documentation: 40 minutes    Payor: WORK COMP / Plan: [employer]UR[employer] / Product Type: *No Product type* /   .  Procedure Code/s   Therapeutic Exercise (28019): 30 minutes    I have reviewed the note as documented above.  This accurately captures the substance of my conversation with the patient.  Provider location: [facility]/[place] ([facility])  Patient location: Home

## 2020-05-13 ENCOUNTER — OFFICE VISIT (OUTPATIENT)
Dept: ORTHOPEDICS | Facility: CLINIC | Age: 35
End: 2020-05-13
Payer: OTHER MISCELLANEOUS

## 2020-05-13 ENCOUNTER — ANCILLARY PROCEDURE (OUTPATIENT)
Dept: GENERAL RADIOLOGY | Facility: CLINIC | Age: 35
End: 2020-05-13
Attending: PHYSICIAN ASSISTANT
Payer: OTHER MISCELLANEOUS

## 2020-05-13 VITALS
BODY MASS INDEX: 28.19 KG/M2 | SYSTOLIC BLOOD PRESSURE: 117 MMHG | WEIGHT: 212.7 LBS | DIASTOLIC BLOOD PRESSURE: 73 MMHG | HEIGHT: 73 IN

## 2020-05-13 DIAGNOSIS — S52.032D CLOSED DISPLACED INTRA-ARTICULAR FRACTURE OF OLECRANON PROCESS OF LEFT ULNA WITH ROUTINE HEALING, SUBSEQUENT ENCOUNTER: ICD-10-CM

## 2020-05-13 DIAGNOSIS — S52.132D CLOSED DISPLACED FRACTURE OF NECK OF LEFT RADIUS WITH ROUTINE HEALING, SUBSEQUENT ENCOUNTER: Primary | ICD-10-CM

## 2020-05-13 DIAGNOSIS — S52.132D CLOSED DISPLACED FRACTURE OF NECK OF LEFT RADIUS WITH ROUTINE HEALING, SUBSEQUENT ENCOUNTER: ICD-10-CM

## 2020-05-13 PROCEDURE — 73080 X-RAY EXAM OF ELBOW: CPT | Mod: TC

## 2020-05-13 PROCEDURE — 99213 OFFICE O/P EST LOW 20 MIN: CPT | Performed by: PHYSICIAN ASSISTANT

## 2020-05-13 ASSESSMENT — MIFFLIN-ST. JEOR: SCORE: 1953.68

## 2020-05-13 ASSESSMENT — PAIN SCALES - GENERAL: PAINLEVEL: NO PAIN (0)

## 2020-05-13 NOTE — PROGRESS NOTES
"Office Visit-Fracture Follow up    Chief Complaint: Art eRece is a 35 year old male who is being seen for   Chief Complaint   Patient presents with     RECHECK     left elbow radial head and neck intra-articular fracture as well as olecranon process.W/C DOI: 3/9/2020       History of Present Illness:   Mechanism of Injury: Fell off of a wall.  Location: Left elbow  Duration of Pain: Since date of injury 3/9/2020 however the patient states he is not having much for pain now.  Rating of Pain: Minimal to none  Pain Quality: 0-1 out of 10.  Pain is better with: Rest  Pain is worse with: Heavy use of the left upper extremity  Treatment so far consists of: Immobilization and therapy.   Associated Features: Patient denies any numbness or tingling.  Patient states that he has been able to climb scaffolding without any pain or soreness after.  Patient has used the cream for about 8 hours in a day and did not have much for soreness but did have some soreness.Patient believes he can lift up to 25 pounds with his left upper extremity.  Pain is Limiting: Heavy extended use of left upper extremity   here to: Orthopedic follow-up and x-ray  Additional History: Patient is doing very well without much pain.  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    REVIEW OF SYSTEMS  General: negative for, night sweats, dizziness, fatigue  Resp: No shortness of breath and no cough  CV: negative for chest pain, syncope or near-syncope  GI: negative for nausea, vomiting and diarrhea  : negative for dysuria and hematuria  Musculoskeletal: as above  Neurologic: negative for syncope   Hematologic: negative for bleeding disorder    Physical Exam:  Vitals: /73   Ht 1.854 m (6' 1\")   Wt 96.5 kg (212 lb 11.2 oz)   BMI 28.06 kg/m    BMI= Body mass index is 28.06 kg/m .  Constitutional: healthy, alert and no acute distress   Psychiatric: mentation " appears normal and affect normal/bright  NEURO: no focal deficits, CMS intact left upper extremity   RESP: Normal with easy respirations and no use of accessory muscles to breathe, no audible wheezing or retractions  CV: +2 radial pulse and his hand is warm to palpation.   SKIN: No erythema, rashes, excoriation, or breakdown. No evidence of infection.   MUSCULOSKELETAL:    INSPECTION of Left elbow: No gross deformities, erythema, edema, ecchymosis, atrophy or fasciculations.     PALPATION: No tenderness medial or lateral epicondyle or the olecranon or in the cubital fossa.  No tenderness forearm, wrist or upper arm.  No increased warmth.     ROM: Active: Elbow flexion 140 degrees compared to 155 on the contralateral side, passive: Elbowbextension 5 degrees short of full extension and full  on the contralateral side, active: Supination 90, pronation 90. The range of motion is without catching locking or pain.        STRENGTH: 5 out of 5 biceps and triceps as well as pronation and supination and      SPECIAL TEST: None  GAIT: non-antalgic  Lymph: no palpable lymph nodes      Diagnostic Modalities:  Recent Results (from the past 744 hour(s))   XR Elbow Left G/E 3 Views    Narrative    LEFT ELBOW THREE OR MORE VIEWS 4/28/2020 2:47 PM     HISTORY: Closed displaced fracture of neck of left radius, initial  encounter. Closed displaced intra-articular fracture of olecranon  process of left ulna, initial encounter.      Impression    IMPRESSION: Left radius neck nondisplaced fracture, alignment  unchanged from 3/16/2020.    SHERYL CAMACHO MD   XR Elbow Left G/E 3 Views    Narrative    ELBOW THREE VIEWS LEFT  5/13/2020 1:38 PM     HISTORY: Closed displaced fracture of neck of left radius with routine  healing, subsequent encounter; Closed displaced intra-articular  fracture of olecranon process of left ulna with routine healing,  subsequent encounter    COMPARISON: 4/28/2020, 3/16/2020    FINDINGS: Again seen is a healing  minimally impacted and angulated  fracture at the junction of the left radial neck and head. This  remains unchanged in alignment. A healing fracture involving the  coronoid process is also unchanged. No effusion.      Impression    IMPRESSION: Healing fractures at the junction of the left radial neck  and head, and of the left coronoid process. No change in alignment.    CLAUDINE PARK MD     I agree with the reading above.  Today's x-ray shows more healing and mineralization on the radial neck.    Independent visualization of the images was performed.      Impression: 1.  2 months and 4 days status post left elbow radial head and neck intra-articular fracture as well as olecranon process.  2.  2 months and 4 days status post nasal fracture.    Plan:  All of the above pertinent physical exam and imaging modalities findings was reviewed with Art.                                          CONSERVATIVE CARE:    FOCUSED PLAN:   X-rays with more mineralization.  We are allowing strengthening now.  Occupational Therapy order placed with the okay to progress to strengthening.  Work note increased amount of lifting and allowed to do ladders but no skid steer yet for the next 6 weeks.  Follow-up in 6 weeks.    Re-x-ray on return: No    BP Readings from Last 1 Encounters:   05/13/20 117/73       BP noted to be well controlled today in office.      Patient does use Tobacco products. Patient not ready to quit at this time.  Strongly encouraged smoking cessation.  Risks of smoking and benefits of quitting were discussed.  Information offered: AVS with information about stopping smoking and advised to discuss smoking cessation medications/strategies with Primary care provider.  3-5 Minutes were spent in counseling.    This note was dictated with [employer].    Alejandro Brown PA-C

## 2020-05-13 NOTE — LETTER
5/13/2020         Art Reece  [address] [place] [address]          To whom it may concern:    RE: Art Cordova was seen in our clinic today. Art Reece may return to work with restrictions.    Patient limitations:  May begin pushing, pulling, lifting, carrying up to 20lbs with left arm for 6 weeks. 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      Please contact me for questions or concerns.      Sincerely,        Alejandro Brown PA-C

## 2020-05-13 NOTE — LETTER
5/13/2020         Art Reece  [address] [facility] [address]          To whom it may concern:    RE: Art Cordova was seen in our clinic today. Art Reece may return to work with restrictions.    Patient limitations:  May begin pushing, pulling, lifting, carrying up to 20lbs with left arm for 2 weeks. 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      Please contact me for questions or concerns.      Sincerely,        Alejandro Brown PA-C

## 2020-05-13 NOTE — LETTER
5/13/2020         RE: Art Reece  [address] [place] [facility] [address]        Dear Colleague,    Thank you for referring your patient, Art Reece, to the [facility]. Please see a copy of my visit note below.    Office Visit-Fracture Follow up    Chief Complaint: Art Reece is a 35 year old male who is being seen for   Chief Complaint   Patient presents with     RECHECK     left elbow radial head and neck intra-articular fracture as well as olecranon process.W/C DOI: 3/9/2020       History of Present Illness:   Mechanism of Injury: Fell off of a wall.  Location: Left elbow  Duration of Pain: Since date of injury 3/9/2020 however the patient states he is not having much for pain now.  Rating of Pain: Minimal to none  Pain Quality: 0-1 out of 10.  Pain is better with: Rest  Pain is worse with: Heavy use of the left upper extremity  Treatment so far consists of: Immobilization and therapy.   Associated Features: Patient denies any numbness or tingling.  Patient states that he has been able to climb scaffolding without any pain or soreness after.  Patient has used the cream for about 8 hours in a day and did not have much for soreness but did have some soreness.Patient believes he can lift up to 25 pounds with his left upper extremity.  Pain is Limiting: Heavy extended use of left upper extremity   here to: Orthopedic follow-up and x-ray  Additional History: Patient is doing very well without much pain.  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    REVIEW OF SYSTEMS  General: negative for, night sweats, dizziness, fatigue  Resp: No shortness of breath and no cough  CV: negative for chest pain, syncope or near-syncope  GI: negative for nausea, vomiting and diarrhea  : negative for dysuria and hematuria  Musculoskeletal: as above  Neurologic: negative for syncope   Hematologic: negative for  "bleeding disorder    Physical Exam:  Vitals: /73   Ht 1.854 m (6' 1\")   Wt 96.5 kg (212 lb 11.2 oz)   BMI 28.06 kg/m    BMI= Body mass index is 28.06 kg/m .  Constitutional: healthy, alert and no acute distress   Psychiatric: mentation appears normal and affect normal/bright  NEURO: no focal deficits, CMS intact left upper extremity   RESP: Normal with easy respirations and no use of accessory muscles to breathe, no audible wheezing or retractions  CV: +2 radial pulse and his hand is warm to palpation.   SKIN: No erythema, rashes, excoriation, or breakdown. No evidence of infection.   MUSCULOSKELETAL:    INSPECTION of Left elbow: No gross deformities, erythema, edema, ecchymosis, atrophy or fasciculations.     PALPATION: No tenderness medial or lateral epicondyle or the olecranon or in the cubital fossa.  No tenderness forearm, wrist or upper arm.  No increased warmth.     ROM: Active: Elbow flexion 140 degrees compared to 155 on the contralateral side, passive: Elbowbextension 5 degrees short of full extension and full  on the contralateral side, active: Supination 90, pronation 90. The range of motion is without catching locking or pain.        STRENGTH: 5 out of 5 biceps and triceps as well as pronation and supination and      SPECIAL TEST: None  GAIT: non-antalgic  Lymph: no palpable lymph nodes      Diagnostic Modalities:  Recent Results (from the past 744 hour(s))   XR Elbow Left G/E 3 Views    Narrative    LEFT ELBOW THREE OR MORE VIEWS 4/28/2020 2:47 PM     HISTORY: Closed displaced fracture of neck of left radius, initial  encounter. Closed displaced intra-articular fracture of olecranon  process of left ulna, initial encounter.      Impression    IMPRESSION: Left radius neck nondisplaced fracture, alignment  unchanged from 3/16/2020.    SHERYL CAMACHO MD   XR Elbow Left G/E 3 Views    Narrative    ELBOW THREE VIEWS LEFT  5/13/2020 1:38 PM     HISTORY: Closed displaced fracture of neck of left " radius with routine  healing, subsequent encounter; Closed displaced intra-articular  fracture of olecranon process of left ulna with routine healing,  subsequent encounter    COMPARISON: 4/28/2020, 3/16/2020    FINDINGS: Again seen is a healing minimally impacted and angulated  fracture at the junction of the left radial neck and head. This  remains unchanged in alignment. A healing fracture involving the  coronoid process is also unchanged. No effusion.      Impression    IMPRESSION: Healing fractures at the junction of the left radial neck  and head, and of the left coronoid process. No change in alignment.    CLAUDINE PARK MD     I agree with the reading above.  Today's x-ray shows more healing and mineralization on the radial neck.    Independent visualization of the images was performed.      Impression: 1.  2 months and 4 days status post left elbow radial head and neck intra-articular fracture as well as olecranon process.  2.  2 months and 4 days status post nasal fracture.    Plan:  All of the above pertinent physical exam and imaging modalities findings was reviewed with Art.                                          CONSERVATIVE CARE:    FOCUSED PLAN:   X-rays with more mineralization.  We are allowing strengthening now.  Occupational Therapy order placed with the okay to progress to strengthening.  Work note increased amount of lifting and allowed to do ladders but no skid steer yet for the next 6 weeks.  Follow-up in 6 weeks.    Re-x-ray on return: No    BP Readings from Last 1 Encounters:   05/13/20 117/73       BP noted to be well controlled today in office.      Patient does use Tobacco products. Patient not ready to quit at this time.  Strongly encouraged smoking cessation.  Risks of smoking and benefits of quitting were discussed.  Information offered: AVS with information about stopping smoking and advised to discuss smoking cessation medications/strategies with Primary care provider.  3-5  Minutes were spent in counseling.    This note was dictated with [employer].    Alejandro Brown PA-C      Again, thank you for allowing me to participate in the care of your patient.        Sincerely,        Alejandro Brown PA-C

## 2020-05-14 ENCOUNTER — VIRTUAL VISIT (OUTPATIENT)
Dept: OCCUPATIONAL THERAPY | Facility: CLINIC | Age: 35
End: 2020-05-14
Payer: OTHER MISCELLANEOUS

## 2020-05-14 DIAGNOSIS — M25.522 LEFT ELBOW PAIN: ICD-10-CM

## 2020-05-14 PROCEDURE — 97110 THERAPEUTIC EXERCISES: CPT | Mod: 95 | Performed by: OCCUPATIONAL THERAPIST

## 2020-05-14 NOTE — PROGRESS NOTES
"Occupational/Hand Therapy Virtual Follow Up Visit      The patient has been notified of following:     \"This virtual visit will be conducted between you and your provider. We have found that certain health care needs can be provided without the need for physical presence.  This service lets us provide the care you need with a virtual visit.\"    Due to external, as well as internal [facility] management of the COVID-19 Virus, Art Reece was not seen in our clinic.  As a substitution, we implemented a virtual visit to manage this patient's condition utilizing the boo-boxx virtual visit platform via the patient s existing code.  The provider, Angelica Brock, reviewed the patient's chart, PTRx prescription, and spoke with the patient to determine the following telemedicine visit is appropriate and effective for the patient's care.    The following type of visit was completed:   Video Visit:  The boo-boxx platform uses a synchronous HIPAA compliant video stream for this patient encounter.      Diagnosis: Comminuted fracture of left radial head/neck, displaced midly comminuted fracture of the coronoid  DOI: 3/9/2020  Post:  9w 3d    S: Art Reece is a 35 year old male. Connected virtually on the boo-boxx platform to discuss their condition/progress. Patient saw MD yesterday and has new orders to begin strengthening, work retrictions increased from 10 to 20# for lifting.  MD orders for 2x/week x 6 weeks. They noted improvements in overall ROM of elbow.  They noted ongoing pain or limitations with strength and weight bearing.      4/14/2020 5/14/2020   At Rest 0-1 0   With Use 1-3 0-1       O: Patient demonstrated HEP without difficulty    ROM  Elbow 4/14/2020 5/14/2020   AROM (PROM) Left * Left*   Extension -30 -30   Flexion 110 130   Supination 35 70   Pronation 65 70   * Measures taken via goniometer through video, approximate    PTRx Content from today's visit:  Exercise Name: Warmth - Sessions: 1-2x/day, Notes: " 5-10 mins for stiffness before exercise  Exercise Name: Elbow Active Range of Motion Flexion with neutral forearm - Reps: 10-15 - Sessions: 3  Exercise Name: Forearm Active Range of Motion Combined Pronation and Supination - Reps: 10 (each way) - Sessions: 3  Exercise Name: Wrist Active Range of Motion Extension and Flexion Combined, Sets: 1 - Reps: 10 - Sessions: 3  Exercise Name: Elbow Active Range of Motion Extension in Supination - Reps: 5 - Sessions: 3, Notes: Hold at gentle end range for 5-10 seconds, repeat 5 times  Exercise Name: Elbow Passive Range of Motion Flexion, Sets: 1 - Reps: 5 - Sessions: 4, Notes: Hold for 15-20 seconds  Exercise Name: Elbow Passive Range of Motion Extension at Edge of Table, Sets: 1 - Reps: 5 - Sessions: 4, Notes: massage bicep with other hand to facilitate extension of elbow  can hold soup can or weight for extra stretch, hold for 15-20 seconds  Exercise Name: Elbow Passive Range of Motion Supination, Sets: 1 - Reps: 5 - Sessions: 4, Notes: Hold for 15-20 sec  Exercise Name: Forearm Passive Range of Motion Pronation, Sets: 1 - Reps: 5 - Sessions: 4, Notes: Hold for 15-20 sec  Exercise Name: Elbow Strengthening Isotonic Flexion - Reps: 10-15 - Sessions: 1-2x/day, Notes: hold 3-5 seconds  Exercise Name: Bent Over Tricep Extension - Reps: 10-15 - Sessions: 1-2  Exercise Name: Elbow Strengthening Isotonic Supination - Reps: 10-15 - Sessions: 1-2x/day, Notes: Alternate from palm up all the way to palm down  hold 3-5 seconds  Exercise Name: Elbow Strengthening Isotonic Pronation - Reps: 10-15 - Sessions: 1-2x/day, Notes: hold 3-5 seconds  Exercise Name: Wrist Strengthening Isotonic Extension - Reps: 10-15 reps - Sessions: 1-2x/day  Exercise Name: Wrist Strengthening Isotonic Flexion - Reps: 10-15 reps - Sessions: 1-2x/day  Exercise Name: Hand Strengthening Gripping - Reps: 20-30 - Sessions: 1-2, Notes: use stress ball or sponge    A:   Patient is progressing as expected.  Patient's  treatment restrictions have been changed.  New orders have been received.  Response to therapy has shown an improvement in  pain level and ROM elbow flexion and P/S  Response to therapy has shown lack of progress in  ROM elbow extension    P: Patient will continue with the exercise program assigned on their PTRx code and will add the following measures to manage their pain/condition: See PTRx content above and HEP summary below     Current treatment program is being advanced to more complex exercises.  The following procedures have been added:  stretching and strengthening    Home Exercise Program:  Warmth for stiffness  Self MFR to bicep to facilitate elbow extension  AROM wrist all planes  A/PROM Elbow all planes  PROM elbow ext in gravity assisted position   Elbow E/F, P/S and wrist E/F isotonics   strengthening    Next Visit:  See for virtual visit in 1 week  Assess response to warmth, self MFR and gravity assisted elbow extension stretch as well as isotonics  Add weight bearing/table top push ups to facilitate elbow extension  Pt would like to continue with video visits 1x/week rather than in clinic visits 2x/week  Will consider in clinic visits if elbow extension does not improve    Virtual visit contact time    Time of service began: 7:55 AM  Time of service ended: 8:20 AM  Total Time for set up, visit, and documentation: 35 minutes    Payor: WORK COMP / Plan: [employer] / Product Type: *No Product type* /   .  Procedure Code/s   Therapeutic Exercise (04294): 25 minutes    I have reviewed the note as documented above.  This accurately captures the substance of my conversation with the patient.  Provider location: JUSTIN Hernández ([facility])  Patient location: home

## 2020-05-20 ENCOUNTER — VIRTUAL VISIT (OUTPATIENT)
Dept: OCCUPATIONAL THERAPY | Facility: CLINIC | Age: 35
End: 2020-05-20
Payer: OTHER MISCELLANEOUS

## 2020-05-20 DIAGNOSIS — M25.522 LEFT ELBOW PAIN: ICD-10-CM

## 2020-05-20 PROCEDURE — 97110 THERAPEUTIC EXERCISES: CPT | Mod: 95 | Performed by: OCCUPATIONAL THERAPIST

## 2020-05-20 NOTE — PROGRESS NOTES
"Occupational/Hand Therapy Virtual Follow Up Visit      The patient has been notified of following:     \"This virtual visit will be conducted between you and your provider. We have found that certain health care needs can be provided without the need for physical presence.  This service lets us provide the care you need with a virtual visit.\"    Due to external, as well as internal [facility] management of the COVID-19 Virus, Art Reece was not seen in our clinic.  As a substitution, we implemented a virtual visit to manage this patient's condition utilizing the [employer] virtual visit platform via the patient s existing code.  The provider, Monet Rouse, reviewed the patient's chart, PTRx prescription, and spoke with the patient to determine the following telemedicine visit is appropriate and effective for the patient's care.    The following type of visit was completed:   Video Visit:  The [employer] platform uses a synchronous HIPAA compliant video stream for this patient encounter.      Diagnosis: Comminuted fracture of left radial head/neck, displaced midly comminuted fracture of the coronoid  DOI: 3/9/2020  Post:  10w 2d    S: Art Reece is a 35 year old male. Connected virtually on the [employer] platform to discuss their condition/progress. They noted improvements in ROM of elbow in flexion.  Patient reports he is able to touch his radio that is placed on the left shoulder better than last week.  He is running a  and able to climb the ladder to get into the  with minimal difficulty.  He has not tried running the front end  yet which has a larger steering wheel on it.  They noted ongoing  limitations with elbow strength, ROM (pronation and extension) and weight bearing.  Patient reports that his right elbow does not extend to 0 degrees.   Current pain level: 1/10    O: Patient demonstrated completion of exercises without difficulty.   ROM  Elbow 4/14/2020 5/14/2020 5/20/20 5/20/20   AROM " (PROM) Left * Left* *Right *Left   Extension -30 -30 -15 -30   Flexion 110 130  140   Supination 35 70  70   Pronation 65 70 80 60   * Measures taken via goniometer through video, approximate      PTRx Content from today's visit:  Reviewed HEP    Exercise Name: Warmth - Sessions: 1-2x/day, Notes: 5-10 mins for stiffness before exercise  Exercise Name: Elbow Active Range of Motion Flexion with neutral forearm - Reps: 10-15 - Sessions: 3  Exercise Name: Forearm Active Range of Motion Combined Pronation and Supination - Reps: 10 (each way) - Sessions: 3  Exercise Name: Wrist Active Range of Motion Extension and Flexion Combined, Sets: 1 - Reps: 10 - Sessions: 3  Exercise Name: Elbow Active Range of Motion Extension in Supination - Reps: 5 - Sessions: 3, Notes: Hold at gentle end range for 5-10 seconds, repeat 5 times  Exercise Name: Elbow Passive Range of Motion Flexion, Sets: 1 - Reps: 5 - Sessions: 4, Notes: Hold for 15-20 seconds  Exercise Name: Elbow Passive Range of Motion Extension at Edge of Table, Sets: 1 - Reps: 5 - Sessions: 4, Notes: massage bicep with other hand to facilitate extension of elbow  can hold soup can or weight for extra stretch, hold for 15-20 seconds  Exercise Name: Elbow Passive Range of Motion Supination, Sets: 1 - Reps: 5 - Sessions: 4, Notes: Hold for 15-20 sec  Exercise Name: Forearm Passive Range of Motion Pronation, Sets: 1 - Reps: 5 - Sessions: 4, Notes: Hold for 15-20 sec  Exercise Name: Elbow Strengthening Isotonic Flexion - Reps: 10-15 - Sessions: 1-2x/day, Notes: hold 3-5 seconds  Exercise Name: Bent Over Tricep Extension - Reps: 10-15 - Sessions: 1-2  Exercise Name: Elbow Strengthening Isotonic Supination - Reps: 10-15 - Sessions: 1-2x/day, Notes: Alternate from palm up all the way to palm down  hold 3-5 seconds  Exercise Name: Elbow Strengthening Isotonic Pronation - Reps: 10-15 - Sessions: 1-2x/day, Notes: hold 3-5 seconds  Exercise Name: Wrist Strengthening Isotonic Extension -  Reps: 10-15 reps - Sessions: 1-2x/day  Exercise Name: Wrist Strengthening Isotonic Flexion - Reps: 10-15 reps - Sessions: 1-2x/day  Exercise Name: Hand Strengthening Gripping - Reps: 20-30 - Sessions: 1-2, Notes: use stress ball or sponge  Exercise Name: Elbow Extension with Wrist Pronation - Reps: 10 reps - Sessions: 2-3 times per day, Notes: Progress to table and wall pushups    A:   Patient is progressing as expected.  Response to therapy has shown an improvement in  pain level and ROM elbow flexion   Response to therapy has shown lack of progress in  ROM elbow extension and pronation  Patient is using garage tools to simulate weights for exercises and states he is using 5-10# for elbow strengthening.    P: Patient will continue with the exercise program assigned on their PTRx code and will add the following measures to manage their pain/condition: See PTRx content above and HEP summary below     Current treatment program is being advanced to more complex exercises.  The following procedures have been added:  stretching and strengthening    Home Exercise Program:  Warmth for stiffness  Self MFR to bicep to facilitate elbow extension  AROM wrist all planes  A/PROM Elbow all planes  PROM elbow ext in gravity assisted position   Elbow E/F, P/S and wrist E/F isotonics   strengthening  Tabletop weight bearing and pushups    Next Visit:  Assess tolerance for weight bearing activities  See for virtual visit in 1 week  Pt would like to continue with video visits 1x/week rather than in clinic visits 2x/week  Will consider in clinic visits if elbow extension does not improve    Virtual visit contact time    Time of service began: 8:00 AM  Time of service ended: 8:15 AM  Total Time for set up, visit, and documentation: 30 minutes    Payor: WORK COMP / Plan:  [employer] / Product Type: *No Product type* /   .  Procedure Code/s   Therapeutic Exercise (09227): 15 minutes    I have reviewed the note as documented  above.  This accurately captures the substance of my conversation with the patient.  Provider location: [place], [place] ([facility])  Patient location: home

## 2020-05-28 ENCOUNTER — VIRTUAL VISIT (OUTPATIENT)
Dept: OCCUPATIONAL THERAPY | Facility: CLINIC | Age: 35
End: 2020-05-28
Payer: OTHER MISCELLANEOUS

## 2020-05-28 DIAGNOSIS — M25.522 LEFT ELBOW PAIN: ICD-10-CM

## 2020-05-28 PROCEDURE — 97110 THERAPEUTIC EXERCISES: CPT | Mod: 95 | Performed by: OCCUPATIONAL THERAPIST

## 2020-05-28 NOTE — PROGRESS NOTES
"Occupational/Hand Therapy Virtual Follow Up Visit      The patient has been notified of following:     \"This virtual visit will be conducted between you and your provider. We have found that certain health care needs can be provided without the need for physical presence.  This service lets us provide the care you need with a virtual visit.\"    Due to external, as well as internal [facility] management of the COVID-19 Virus, Art Reece was not seen in our clinic.  As a substitution, we implemented a virtual visit to manage this patient's condition utilizing the [employer] virtual visit platform via the patient s existing code.  The provider, Angelica Brock, reviewed the patient's chart, PTRx prescription, and spoke with the patient to determine the following telemedicine visit is appropriate and effective for the patient's care.    The following type of visit was completed:   Video Visit:  The [employer] platform uses a synchronous HIPAA compliant video stream for this patient encounter.      Diagnosis: Comminuted fracture of left radial head/neck, displaced midly comminuted fracture of the coronoid  DOI: 3/9/2020  Post:  11w 3d    S: Art Reece is a 35 year old male. Connected virtually on the [employer] platform to discuss their condition/progress. They noted improvements in strength and ROM.  They noted ongoing limitations with heavy lifting.  Patient feels his return is 80-90% of normal.  Pain level:   4/14/2020 5/14/2020 5/28/2020   At Rest 0-1 0    With Use 1-3 0-1 0     O: Patient demonstrated HEP without difficulty   ROM  Elbow 4/14/2020 5/14/2020 5/20/20 5/28/2020   AROM (PROM) Left * Left* *Left Left*   Extension -30 -30 -30 -20   Flexion 110 130 140 145   Supination 35 70 70 70   Pronation 65 70 60 70   * Measures taken via goniometer through video, approximate    PTRx Content from today's visit:  Exercise Name: Warmth - Sessions: 1-2x/day, Notes: 5-10 mins for stiffness before exercise  Exercise Name: " Elbow Active Range of Motion Flexion with neutral forearm - Reps: 10-15 - Sessions: 3  Exercise Name: Forearm Active Range of Motion Combined Pronation and Supination - Reps: 10 (each way) - Sessions: 3  Exercise Name: Wrist Active Range of Motion Extension and Flexion Combined, Sets: 1 - Reps: 10 - Sessions: 3  Exercise Name: Elbow Active Range of Motion Extension in Supination - Reps: 5 - Sessions: 3, Notes: Hold at gentle end range for 5-10 seconds, repeat 5 times  Exercise Name: Elbow Passive Range of Motion Flexion, Sets: 1 - Reps: 5 - Sessions: 4, Notes: Hold for 15-20 seconds  Exercise Name: Elbow Passive Range of Motion Extension at Edge of Table, Sets: 1 - Reps: 5 - Sessions: 4, Notes: massage bicep with other hand to facilitate extension of elbow  can hold soup can or weight for extra stretch, hold for 15-20 seconds  Exercise Name: Elbow Passive Range of Motion Supination, Sets: 1 - Reps: 5 - Sessions: 4, Notes: Hold for 15-20 sec  Exercise Name: Forearm Passive Range of Motion Pronation, Sets: 1 - Reps: 5 - Sessions: 4, Notes: Hold for 15-20 sec  Exercise Name: Elbow Strengthening Isotonic Flexion - Reps: 10-15 - Sessions: 1-2x/day, Notes: hold 3-5 seconds  Exercise Name: Bent Over Tricep Extension - Reps: 10-15 - Sessions: 1-2  Exercise Name: Elbow Strengthening Isotonic Supination - Reps: 10-15 - Sessions: 1-2x/day, Notes: Alternate from palm up all the way to palm down  hold 3-5 seconds  Exercise Name: Elbow Strengthening Isotonic Pronation - Reps: 10-15 - Sessions: 1-2x/day, Notes: hold 3-5 seconds  Exercise Name: Wrist Strengthening Isotonic Extension - Reps: 10-15 reps - Sessions: 1-2x/day  Exercise Name: Wrist Strengthening Isotonic Flexion - Reps: 10-15 reps - Sessions: 1-2x/day  Exercise Name: Hand Strengthening Gripping - Reps: 20-30 - Sessions: 1-2, Notes: use stress ball or sponge  Exercise Name: Elbow Extension with Wrist Pronation - Reps: 10 reps - Sessions: 2-3 times per day, Notes:  Progress to table and wall pushups    A:   Patient is progressing well and is ready to decrease frequency of treatment in the clinic.  Response to therapy has shown an improvement in  ROM  and strength    P: Patient will continue with the exercise program assigned on their PTRx code and will add the following measures to manage their pain/condition: See PTRx content above and HEP summary below.     Frequency and/or duration have been changed to:  Yes,  2 X  month    Home Exercise Program:  Warmth for stiffness  Self MFR to bicep to facilitate elbow extension  AROM wrist all planes  A/PROM Elbow all planes  PROM elbow ext in gravity assisted position   Elbow E/F, P/S and wrist E/F isotonics   strengthening  Tabletop weight bearing and pushups    Next Visit:  See in 2 weeks, discharge to HEP if doing well  Pt would like to continue with video visits rather than in clinic visits  Progress report next visit for MD jerez/arden 6/24/2020    Virtual visit contact time    Time of service began: 7:55 AM  Time of service ended: 8:10 AM  Total Time for set up, visit, and documentation: 20 minutes    Payor: WORK COMP / Plan: [employer]URANCE / Product Type: *No Product type* /   .  Procedure Code/s   Therapeutic Exercise (00701): 10 minutes    I have reviewed the note as documented above.  This accurately captures the substance of my conversation with the patient.  Provider location: [place] ([facility]/State)  Patient location: home

## 2020-06-09 ENCOUNTER — VIRTUAL VISIT (OUTPATIENT)
Dept: OCCUPATIONAL THERAPY | Facility: CLINIC | Age: 35
End: 2020-06-09
Payer: OTHER MISCELLANEOUS

## 2020-06-09 DIAGNOSIS — M25.522 LEFT ELBOW PAIN: ICD-10-CM

## 2020-06-09 PROCEDURE — 97110 THERAPEUTIC EXERCISES: CPT | Mod: 95 | Performed by: OCCUPATIONAL THERAPIST

## 2020-06-09 NOTE — PROGRESS NOTES
"Hand Therapy Progress/Discharge Note-virtual visit      The patient has been notified of following:     \"This virtual visit will be conducted between you and your provider. We have found that certain health care needs can be provided without the need for physical presence.  This service lets us provide the care you need with a virtual visit.\"    Due to external, as well as internal [facility] management of the COVID-19 Virus, Art Reece was not seen in our clinic.  As a substitution, we implemented a virtual visit to manage this patient's condition utilizing the [employer] virtual visit platform via the patient s existing code.  The provider, Angelica Brock, reviewed the patient's chart, PTRx prescription, and spoke with the patient to determine the following telemedicine visit is appropriate and effective for the patient's care.    The following type of visit was completed:   Video Visit:  The [employer] platform uses a synchronous HIPAA compliant video stream for this patient encounter.      Current Date:  6/9/2020    Diagnosis: Comminuted fracture of left radial head/neck, displaced midly comminuted fracture of the coronoid  DOI: 3/9/2020  Post: 13w 1d    Reporting period is 3/25/2020 to 6/9/2020    Subjective:   Subjective changes noted by patient:  Art Reece is a 35 year old male. Connected virtually on the [employer] platform to discuss their condition/progress. Subjective changes noted by patient:  Pt feels elbow is doing well. Just a little weakness, feels about 90% of normal. Notes some clicking with rotation but this is not painful.  Functional changes noted by patient:  Improvement in Work Tasks  Patient has noted adverse reaction to:  None    Functional Outcome Measure:  Functional Outcome Measure:  Upper Extremity Functional Index  SCORE:   Column Totals: 79/80  (A lower score indicates greater disability.)    Objective:  Pain Level (Scale 0-10)   3/25/2020 5/28/2020   At Rest 1 0   With Use 1 0     Pain " Description  Date 3/25/2020   Location elbow   Pain Quality Aching   Frequency intermittent     Pain is worst  daytime   Exacerbated by  motion   Relieved by rest   Progression improving     Edema  Minimal per patient report    Sensation   WNL throughout all nerve distributions; per patient report    ROM  * Measures taken via goniometer through video, approximate  Elbow 3/25/2020 6/9/2020   AROM (PROM) Left *Left   Extension -48 -15   Flexion 102 150   Supination 40 80   Pronation 53 70     Strength  Unable to assess during virtual visit but pt reports improvement    PTRx Content from today's visit:  Exercise Name: Warmth - Sessions: 1-2x/day, Notes: 5-10 mins for stiffness before exercise  Exercise Name: Elbow Active Range of Motion Flexion with neutral forearm - Reps: 10-15 - Sessions: 3  Exercise Name: Forearm Active Range of Motion Combined Pronation and Supination - Reps: 10 (each way) - Sessions: 3  Exercise Name: Wrist Active Range of Motion Extension and Flexion Combined, Sets: 1 - Reps: 10 - Sessions: 3  Exercise Name: Elbow Active Range of Motion Extension in Supination - Reps: 5 - Sessions: 3, Notes: Hold at gentle end range for 5-10 seconds, repeat 5 times  Exercise Name: Elbow Passive Range of Motion Flexion, Sets: 1 - Reps: 5 - Sessions: 4, Notes: Hold for 15-20 seconds  Exercise Name: Elbow Passive Range of Motion Extension at Edge of Table, Sets: 1 - Reps: 5 - Sessions: 4, Notes: massage bicep with other hand to facilitate extension of elbow  can hold soup can or weight for extra stretch, hold for 15-20 seconds  Exercise Name: Elbow Passive Range of Motion Supination, Sets: 1 - Reps: 5 - Sessions: 4, Notes: Hold for 15-20 sec  Exercise Name: Forearm Passive Range of Motion Pronation, Sets: 1 - Reps: 5 - Sessions: 4, Notes: Hold for 15-20 sec  Exercise Name: Elbow Strengthening Isotonic Flexion - Reps: 10-15 - Sessions: 1-2x/day, Notes: hold 3-5 seconds  Exercise Name: Bent Over Tricep Extension -  Reps: 10-15 - Sessions: 1-2  Exercise Name: Elbow Strengthening Isotonic Supination - Reps: 10-15 - Sessions: 1-2x/day, Notes: Alternate from palm up all the way to palm down  hold 3-5 seconds  Exercise Name: Elbow Strengthening Isotonic Pronation - Reps: 10-15 - Sessions: 1-2x/day, Notes: hold 3-5 seconds  Exercise Name: Wrist Strengthening Isotonic Extension - Reps: 10-15 reps - Sessions: 1-2x/day  Exercise Name: Wrist Strengthening Isotonic Flexion - Reps: 10-15 reps - Sessions: 1-2x/day  Exercise Name: Hand Strengthening Gripping - Reps: 20-30 - Sessions: 1-2, Notes: use stress ball or sponge  Exercise Name: Elbow Extension with Wrist Pronation - Reps: 10 reps - Sessions: 2-3 times per day, Notes: Progress to table and wall pushups    Assessment:  Response to therapy has been improvement to:  ROM of Elbow:  All Planes  Strength:  Elbow and wrist strength    Overall Assessment:  Patient's symptoms are resolving and patient is independent in home exercise program  STG/LTG:  STGoals have been reviewed and progress or achievement has occurred;  see goal sheet for details and updates.    Plan:  Frequency/Duration:  Discharge from Hand Therapy; continue home program.  Appropriateness of Rx I have re-evaluated this patient and find that the nature, scope, duration and intensity of the therapy is appropriate for the medical condition of the patient.  Recommendations for Continued Therapy  Home Exercise Program:  Warmth for stiffness  Self MFR to bicep to facilitate elbow extension  AROM wrist all planes  A/PROM Elbow all planes  PROM elbow ext in gravity assisted position   Elbow E/F, P/S and wrist E/F isotonics   strengthening  Tabletop weight bearing and pushups  MD f/u 6/24/2020    Virtual visit contact time    Time of service began: 7:55 AM  Time of service ended: 8:10 AM  Total Time for set up, visit, and documentation: 20 minutes    Payor: WORK COMP / Plan:  [employer] / Product Type: *No Product  type* /     Procedure Code/s   Therapeutic Exercise (41793): 15 minutes    I have reviewed the note as documented above.  This accurately captures the substance of my conversation with the patient.  Provider location: [place] ([facility]/State)  Patient location: home

## 2020-06-24 ENCOUNTER — OFFICE VISIT (OUTPATIENT)
Dept: ORTHOPEDICS | Facility: CLINIC | Age: 35
End: 2020-06-24
Payer: OTHER MISCELLANEOUS

## 2020-06-24 VITALS
HEIGHT: 73 IN | SYSTOLIC BLOOD PRESSURE: 128 MMHG | DIASTOLIC BLOOD PRESSURE: 80 MMHG | WEIGHT: 212 LBS | BODY MASS INDEX: 28.1 KG/M2

## 2020-06-24 DIAGNOSIS — S52.132D CLOSED DISPLACED FRACTURE OF NECK OF LEFT RADIUS WITH ROUTINE HEALING, SUBSEQUENT ENCOUNTER: Primary | ICD-10-CM

## 2020-06-24 DIAGNOSIS — S52.032D CLOSED DISPLACED INTRA-ARTICULAR FRACTURE OF OLECRANON PROCESS OF LEFT ULNA WITH ROUTINE HEALING, SUBSEQUENT ENCOUNTER: ICD-10-CM

## 2020-06-24 PROCEDURE — 99213 OFFICE O/P EST LOW 20 MIN: CPT | Performed by: PHYSICIAN ASSISTANT

## 2020-06-24 ASSESSMENT — PAIN SCALES - GENERAL: PAINLEVEL: NO PAIN (0)

## 2020-06-24 ASSESSMENT — MIFFLIN-ST. JEOR: SCORE: 1950.51

## 2020-06-24 NOTE — PROGRESS NOTES
"Office Visit-Fracture Follow up    Chief Complaint: Art Reece is a 35 year old male who is being seen for   Chief Complaint   Patient presents with     Work Comp     RECHECK     Left elbow        History of Present Illness:   Mechanism of Injury: Fall  Location: Left elbow  Duration of Pain:   Rating of Pain: 0 out of 10  Pain Quality: No pain   Pain is better with: No pain  Pain is worse with: No pain   Treatment so far consists of: Immobilization, Occupational Therapy.   Associated Features: Denies numbness or tingling.  Denies any problems with his left elbow other than some clicking but no pain.  Pain is Limiting: Nothing  Here to: Orthopedic follow-up/Workmen's Comp.  Additional History: Patient has been working at work and doing very well.  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    REVIEW OF SYSTEMS  General: negative for, night sweats, dizziness, fatigue  Resp: No shortness of breath and no cough  CV: negative for chest pain, syncope or near-syncope  GI: negative for nausea, vomiting and diarrhea  : negative for dysuria and hematuria  Musculoskeletal: as above  Neurologic: negative for syncope   Hematologic: negative for bleeding disorder    Physical Exam:  Vitals: /80   Ht 1.854 m (6' 1\")   Wt 96.2 kg (212 lb)   BMI 27.97 kg/m    BMI= Body mass index is 27.97 kg/m .  Constitutional: healthy, alert and no acute distress   Psychiatric: mentation appears normal and affect normal/bright  NEURO: no focal deficits, CMS intact left upper extremity   RESP: Normal with easy respirations and no use of accessory muscles to breathe, no audible wheezing or retractions  CV: +2 radial pulse and his hand is warm to palpation.   SKIN: No erythema, rashes, excoriation, or breakdown. No evidence of infection.   MUSCULOSKELETAL:    INSPECTION of   Left elbow: No gross deformities, erythema, edema, ecchymosis, atrophy or fasciculations.     PALPATION: No tenderness medial or lateral epicondyle or the " olecranon or in the cubital fossa.  No tenderness forearm, wrist or upper arm.  No increased warmth.     ROM: Active: Elbow flexion 155 degrees and symmetrical to the contralateral side, Extension 3-5 degrees short of full extension contralateral side is full extension , supination 90, pronation 90. The range of motion is without catching locking or pain, except a small click every once a while with supination and pronation but there is no pain.  And this was only every once in a while.     STRENGTH:   5 out of 5 elbow flexion and elbow extension and 5 out of 5 supination and pronation    SPECIAL TEST: none  GAIT: non-antalgic  Lymph: no palpable lymph nodes      Diagnostic Modalities:   No radiographs necessary today.    Impression: 1.  3 months and 13 days status post radial head and neck intra-articular fracture and olecranon process fracture.  2.  3 months of 15 days status post nasal fracture    Plan:  All of the above pertinent physical exam and imaging modalities findings was reviewed with Art.                                          CONSERVATIVE CARE:    FOCUSED PLAN:   Patient is doing excellent today.  He is having no problems at work. He graduated  Virtual Occupational Therapy last week.  He is having no pain.  Patient is ready to go back to work without restrictions so we wrote him that note today.  Follow-up on an as-needed basis.    Re-x-ray on return: No    BP Readings from Last 1 Encounters:   06/24/20 128/80       BP noted to be well controlled today in office.      Patient does use Tobacco products. Patient not ready to quit at this time.  Strongly encouraged smoking cessation.  Risks of smoking and benefits of quitting were discussed.  Information offered: AVS with information about stopping smoking and advised to discuss smoking cessation medications/strategies with Primary care provider.  3-5 Minutes were spent in counseling.    This note was dictated with [employer].    Alejandro Brown PA-C

## 2020-06-24 NOTE — LETTER
"    6/24/2020         RE: Art Reece  [address] [facility] [address]        Dear Colleague,    Thank you for referring your patient, Art Reece, to the [facility]. Please see a copy of my visit note below.    Office Visit-Fracture Follow up    Chief Complaint: Art Reece is a 35 year old male who is being seen for   Chief Complaint   Patient presents with     Work Comp     RECHECK     Left elbow        History of Present Illness:   Mechanism of Injury: Fall  Location: Left elbow  Duration of Pain:   Rating of Pain: 0 out of 10  Pain Quality: No pain   Pain is better with: No pain  Pain is worse with: No pain   Treatment so far consists of: Immobilization, Occupational Therapy.   Associated Features: Denies numbness or tingling.  Denies any problems with his left elbow other than some clicking but no pain.  Pain is Limiting: Nothing  Here to: Orthopedic follow-up/Workmen's Comp.  Additional History: Patient has been working at work and doing very well.  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    REVIEW OF SYSTEMS  General: negative for, night sweats, dizziness, fatigue  Resp: No shortness of breath and no cough  CV: negative for chest pain, syncope or near-syncope  GI: negative for nausea, vomiting and diarrhea  : negative for dysuria and hematuria  Musculoskeletal: as above  Neurologic: negative for syncope   Hematologic: negative for bleeding disorder    Physical Exam:  Vitals: /80   Ht 1.854 m (6' 1\")   Wt 96.2 kg (212 lb)   BMI 27.97 kg/m    BMI= Body mass index is 27.97 kg/m .  Constitutional: healthy, alert and no acute distress   Psychiatric: mentation appears normal and affect normal/bright  NEURO: no focal deficits, CMS intact left upper extremity   RESP: Normal with easy respirations and no use of accessory muscles to breathe, no audible wheezing or retractions  CV: +2 radial pulse and his hand is warm to palpation.   SKIN: No erythema, rashes, " excoriation, or breakdown. No evidence of infection.   MUSCULOSKELETAL:    INSPECTION of   Left elbow: No gross deformities, erythema, edema, ecchymosis, atrophy or fasciculations.     PALPATION: No tenderness medial or lateral epicondyle or the olecranon or in the cubital fossa.  No tenderness forearm, wrist or upper arm.  No increased warmth.     ROM: Active: Elbow flexion 155 degrees and symmetrical to the contralateral side, Extension 3-5 degrees short of full extension contralateral side is full extension , supination 90, pronation 90. The range of motion is without catching locking or pain, except a small click every once a while with supination and pronation but there is no pain.  And this was only every once in a while.     STRENGTH:   5 out of 5 elbow flexion and elbow extension and 5 out of 5 supination and pronation    SPECIAL TEST: none  GAIT: non-antalgic  Lymph: no palpable lymph nodes      Diagnostic Modalities:   No radiographs necessary today.    Impression: 1.  3 months and 13 days status post radial head and neck intra-articular fracture and olecranon process fracture.  2.  3 months of 15 days status post nasal fracture    Plan:  All of the above pertinent physical exam and imaging modalities findings was reviewed with Art.                                          CONSERVATIVE CARE:    FOCUSED PLAN:   Patient is doing excellent today.  He is having no problems at work. He graduated  [employer] last week.  He is having no pain.  Patient is ready to go back to work without restrictions so we wrote him that note today.  Follow-up on an as-needed basis.    Re-x-ray on return: No    BP Readings from Last 1 Encounters:   06/24/20 128/80       BP noted to be well controlled today in office.      Patient does use Tobacco products. Patient not ready to quit at this time.  Strongly encouraged smoking cessation.  Risks of smoking and benefits of quitting were discussed.  Information  offered: AVS with information about stopping smoking and advised to discuss smoking cessation medications/strategies with Primary care provider.  3-5 Minutes were spent in counseling.    This note was dictated with [facility].    Alejandro Brown PA-C     Again, thank you for allowing me to participate in the care of your patient.        Sincerely,        Alejandro Brown PA-C

## 2020-06-24 NOTE — LETTER
[address] [address]  Phone: [phone]  Fax: [phone]    June 24, 2020        Art Reece  [address] [facility] [address]          To whom it may concern:    RE: Art Reece    Patient was seen and treated today at our clinic.  He may return to work with no restrictions.    Please contact me for questions or concerns.      Sincerely,        Alejandro Brown PA-C

## 2020-07-01 ENCOUNTER — TELEPHONE (OUTPATIENT)
Dept: ORTHOPEDICS | Facility: CLINIC | Age: 35
End: 2020-07-01

## 2020-07-01 NOTE — TELEPHONE ENCOUNTER
Successfully faxed 6/24/2020 progress notes and workability letter from Bony Brown PA-C to [employer] @ [phone]. Original [employer] letter dated 6/26/2020 sent to scanning, Copy of form in my [place] drawer. Manasa Carrington [facility], July 1, 2020

## 2020-07-15 ENCOUNTER — TELEPHONE (OUTPATIENT)
Dept: ORTHOPEDICS | Facility: CLINIC | Age: 35
End: 2020-07-15

## 2020-07-15 NOTE — TELEPHONE ENCOUNTER
I did receive some disability paperwork on this patient today.  His MMI is 6/24/2020  that is when he went back to work without restrictions.  I do feel it is a little bit too early for the patient to sign off on permanent partial disability.  Patient did have 3 degrees to 5 degrees short of full extension on his elbow when I saw him last.  I would be more comfortable doing this at full 6 months after the injury which would be 9/16/2020.  I filled out the paperwork and given to our team to process.

## 2020-08-04 NOTE — TELEPHONE ENCOUNTER
Left message for Maegan at [employer] - Please obtain the fax number for us to send the form in.  Form is in [facility](niki's) drawer - please refax once obtained.     Radha Arnold CMA on 8/4/2020 at 3:38 PM

## 2020-08-04 NOTE — TELEPHONE ENCOUNTER
[employer] company calling to state they have not received this paperwork yet? Please fax completed paperwork.

## 2020-10-09 ENCOUNTER — TELEPHONE (OUTPATIENT)
Dept: ORTHOPEDICS | Facility: OTHER | Age: 35
End: 2020-10-09

## 2020-10-09 NOTE — TELEPHONE ENCOUNTER
Forms were placed in the provider's basket to review and sign if appropriate.     Fax to : [employer] [phone] Attn: Maegan Kapadia  when complete.     Maral Villalpando on 10/9/2020 at 1:16 PM

## 2020-10-09 NOTE — TELEPHONE ENCOUNTER
Reason for Call:  Form, our goal is to have forms completed with 72 hours, however, some forms may require a visit or additional information.    Type of letter, form or note:  disability    Who is the form from?: Insurance comp    Where did the form come from: form was faxed in    What clinic location was the form placed at?: [place]    Where the form was placed: [facility] Box/Folder    What number is listed as a contact on the form?: fax [phone]       Additional comments: none    Call taken on 10/9/2020 at 9:02 AM by Angelica Momin

## 2020-10-14 NOTE — TELEPHONE ENCOUNTER
I received and looked at these forms today.  They are looking for disability.  I contacted the patient just to make sure he was doing fine.  The last time I talked to him he was doing excellent other than not having completely full range of motion.  I talked to him today and he is back to work doing very well having no problems or complaints with his elbow.  He is able to split wood.  He states that his range of motion is almost identical maybe just a few degrees different otherwise he is doing fantastic.  No disability will be needed for this patient.  His MMI remains 6/24/2020 and he has 0% disability.  Form is given back to my team to process.

## 2020-12-13 ENCOUNTER — HEALTH MAINTENANCE LETTER (OUTPATIENT)
Age: 35
End: 2020-12-13

## 2021-01-25 ENCOUNTER — HOSPITAL ENCOUNTER (EMERGENCY)
Facility: CLINIC | Age: 36
Discharge: HOME | End: 2021-01-25
Attending: PHYSICIAN ASSISTANT | Admitting: PHYSICIAN ASSISTANT
Payer: OTHER MISCELLANEOUS

## 2021-01-25 ENCOUNTER — APPOINTMENT (OUTPATIENT)
Dept: GENERAL RADIOLOGY | Facility: CLINIC | Age: 36
End: 2021-01-25
Attending: PHYSICIAN ASSISTANT
Payer: OTHER MISCELLANEOUS

## 2021-01-25 VITALS
HEART RATE: 80 BPM | DIASTOLIC BLOOD PRESSURE: 85 MMHG | RESPIRATION RATE: 16 BRPM | TEMPERATURE: 98 F | OXYGEN SATURATION: 98 % | SYSTOLIC BLOOD PRESSURE: 135 MMHG

## 2021-01-25 DIAGNOSIS — S51.812A LACERATION OF LEFT FOREARM, INITIAL ENCOUNTER: ICD-10-CM

## 2021-01-25 DIAGNOSIS — S40.852A: ICD-10-CM

## 2021-01-25 PROCEDURE — 90471 IMMUNIZATION ADMIN: CPT | Performed by: PHYSICIAN ASSISTANT

## 2021-01-25 PROCEDURE — 73090 X-RAY EXAM OF FOREARM: CPT | Mod: LT

## 2021-01-25 PROCEDURE — 99283 EMERGENCY DEPT VISIT LOW MDM: CPT | Mod: 25 | Performed by: PHYSICIAN ASSISTANT

## 2021-01-25 PROCEDURE — 12031 INTMD RPR S/A/T/EXT 2.5 CM/<: CPT | Performed by: PHYSICIAN ASSISTANT

## 2021-01-25 PROCEDURE — 250N000011 HC RX IP 250 OP 636: Performed by: PHYSICIAN ASSISTANT

## 2021-01-25 PROCEDURE — 90715 TDAP VACCINE 7 YRS/> IM: CPT | Performed by: PHYSICIAN ASSISTANT

## 2021-01-25 RX ORDER — CEPHALEXIN 500 MG/1
500 CAPSULE ORAL 4 TIMES DAILY
Qty: 28 CAPSULE | Refills: 0 | Status: SHIPPED | OUTPATIENT
Start: 2021-01-25 | End: 2021-02-01

## 2021-01-25 RX ADMIN — CLOSTRIDIUM TETANI TOXOID ANTIGEN (FORMALDEHYDE INACTIVATED), CORYNEBACTERIUM DIPHTHERIAE TOXOID ANTIGEN (FORMALDEHYDE INACTIVATED), BORDETELLA PERTUSSIS TOXOID ANTIGEN (GLUTARALDEHYDE INACTIVATED), BORDETELLA PERTUSSIS FILAMENTOUS HEMAGGLUTININ ANTIGEN (FORMALDEHYDE INACTIVATED), BORDETELLA PERTUSSIS PERTACTIN ANTIGEN, AND BORDETELLA PERTUSSIS FIMBRIAE 2/3 ANTIGEN 0.5 ML: 5; 2; 2.5; 5; 3; 5 INJECTION, SUSPENSION INTRAMUSCULAR at 19:26

## 2021-01-26 NOTE — ED TRIAGE NOTES
Pt comes in with complaints of a laceration to his L forearm. Pt states he cut it on steel at work today. Last tetanus vaccination was 2014.

## 2021-01-26 NOTE — ED PROVIDER NOTES
History     Chief Complaint   Patient presents with     Laceration     HPI  Art Reece is a 36 year old male who presents to the emergency department for concerns of a laceration. The patient reports he was working on his vehicle and there was a piece of steel lodged in his tire that he pulled out and it flew at his left forearm.  He sustained a laceration to the mid forearm.  It bled quite a lot but now is controlled.  He is unsure if this steel flew into the arm or not.  He is able to move hand and fingers fine.  Denies any other injuries.  Last tetanus 2014.    Allergies:  No Known Allergies    Problem List:    There are no active problems to display for this patient.       Past Medical History:    Past Medical History:   Diagnosis Date     Back pain        Past Surgical History:    History reviewed. No pertinent surgical history.    Family History:    History reviewed. No pertinent family history.    Social History:  Marital Status:   [2]  Social History     Tobacco Use     Smoking status: Current Every Day Smoker     Packs/day: 1.00     Smokeless tobacco: Never Used   Substance Use Topics     Alcohol use: Yes     Alcohol/week: 20.0 standard drinks     Types: 24 Cans of beer per week     Comment: daily 3-5 beers     Drug use: No        Medications:         cephALEXin (KEFLEX) 500 MG capsule       ibuprofen (ADVIL,MOTRIN) 200 MG tablet          Review of Systems   All other systems reviewed and are negative.      Physical Exam   BP: 135/85  Pulse: 84  Temp: 98  F (36.7  C)  Resp: 16  SpO2: 98 %      Physical Exam  Vitals signs and nursing note reviewed.   Constitutional:       General: He is not in acute distress.     Appearance: Normal appearance. He is normal weight. He is not ill-appearing, toxic-appearing or diaphoretic.   HENT:      Head: Normocephalic and atraumatic.   Eyes:      Extraocular Movements: Extraocular movements intact.      Conjunctiva/sclera: Conjunctivae normal.   Neck:       Musculoskeletal: Neck supple.   Cardiovascular:      Pulses: Normal pulses.   Pulmonary:      Effort: Pulmonary effort is normal.   Musculoskeletal:      Comments: 1 cm laceration noted to left mid anterior/lateral forearm soft tissue.  No active bleeding.  No foreign body palpated.  No abnormalities otherwise in left arm.    Skin:     General: Skin is warm and dry.   Neurological:      General: No focal deficit present.      Mental Status: He is alert and oriented to person, place, and time. Mental status is at baseline.   Psychiatric:         Mood and Affect: Mood normal.         Behavior: Behavior normal.         ED [facility]    -Laceration Repair    Date/Time: 1/25/2021 9:12 PM  Performed by: Jane Valentin PA-C  Authorized by: Jane Valentin PA-C       ANESTHESIA (see MAR for exact dosages):     Anesthesia method:  Local infiltration    Local anesthetic:  Lidocaine 1% WITH epi  LACERATION DETAILS     Location:  Shoulder/arm    Shoulder/arm location:  L lower arm    Length (cm):  1    REPAIR TYPE:     Repair type:  Simple      EXPLORATION:     Wound exploration: wound explored through full range of motion and entire depth of wound probed and visualized      Wound extent: foreign bodies/material      Wound extent: no signs of injury, no nerve damage, no tendon damage, no underlying fracture and no vascular damage      Foreign bodies/material:  Seen on x-ray, attempted to remove with forceps without success    TREATMENT:     Area cleansed with:  Saline    Amount of cleaning:  Extensive    Irrigation solution:  Sterile saline    Irrigation method:  Tap and syringe    Visualized foreign bodies/material removed: no      SKIN REPAIR     Repair method:  Sutures    Suture size:  4-0    Suture material:  Nylon    Number of sutures:  1    APPROXIMATION     Approximation:  Close    POST-PROCEDURE DETAILS     Dressing:  Antibiotic ointment and adhesive  bandage      PROCEDURE   Patient Tolerance:  Patient tolerated the procedure well with no immediate complications          Results for orders placed or performed during the hospital encounter of 01/25/21 (from the past 24 hour(s))   Radius/Ulna XR,  PA &LAT, left    Narrative    EXAM: XR FOREARM LT 2 VW  LOCATION: [facility]  DATE/TIME: 1/25/2021 7:30 PM    INDICATION: Arm pain. Recent puncture wound.  COMPARISON: None.      Impression    IMPRESSION: No fracture. There is a 4 mm metallic foreign body in the volar/radial soft tissues of the proximal forearm.       Medications   Tdap (tetanus-diphtheria-acell pertussis) (ADACEL) injection 0.5 mL (0.5 mLs Intramuscular Given 1/25/21 1926)          Assessments & Plan (with Medical Decision Making)  Art Reece is a 36 year old male who presented to the ED with a laceration to the left forearm.  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  Concern for retained foreign body.  He reports it bled quite a blood initially but since resolved.  On exam today he did have a 1 cm laceration to the anterior/lateral forearm without active bleeding.  No palpable foreign body present but given concern x-ray was obtained to evaluate for this.  He did have a 4 mm metallic foreign body in the soft tissues of the proximal forearm as detailed above.  We did attempt to remove this after educated discussion but unfortunately despite multiple attempts with forceps unable to remove object.  Decision made to close laceration and have patient follow-up with orthopedics.  Wound was copiously irrigated but given higher risk for infection with foreign body that may have been dirty, he will be started on cephalexin for infection prophylaxis.  His Tdap was also updated today.  He was advised to keep wound covered with antibiotic ointment and bandage.  He  can have stitches removed in 10 days but hopefully will follow up with orthopedics before then for further evaluation and management.  He was given instructions on when to return to the ED.  All questions were answered and patient was discharged home in suitable condition.     I have reviewed the nursing notes.    I have reviewed the findings, diagnosis, plan and need for follow up with the patient.    New Prescriptions    CEPHALEXIN (KEFLEX) 500 MG CAPSULE    Take 1 capsule (500 mg) by mouth 4 times daily for 7 days       Final diagnoses:   Laceration of left forearm, initial encounter   Foreign body in upper extremity, left, initial encounter     Note: Chart documentation done in part with Dragon Voice Recognition software. Although reviewed after completion, some word and grammatical errors may remain.     1/25/2021   [facility] EMERGENCY DEPT     Jane Valentin PA-C  01/25/21 0366

## 2021-01-26 NOTE — DISCHARGE INSTRUCTIONS
Have the stitch removed in 7 to 10 days in the clinic.  Keep the wound covered with bacitracin and a bandage.  Start the antibiotics to prevent infection given the fact you still have a piece of metal in your arm.  Apply ice to the arm to help with discomfort and take ibuprofen for pain.  Orthopedic referral was provided for follow-up to look into this issue further.  If you develop any worsening concerns such as signs of infection please return to the emergency department.    Thank you for choosing [facility]'s Emergency Department. It was a pleasure taking care of you today. If you have any questions, please call [phone].    Jane Valentin PA-C

## 2021-01-28 ENCOUNTER — OFFICE VISIT (OUTPATIENT)
Dept: ORTHOPEDICS | Facility: CLINIC | Age: 36
End: 2021-01-28
Attending: PHYSICIAN ASSISTANT
Payer: OTHER MISCELLANEOUS

## 2021-01-28 VITALS
DIASTOLIC BLOOD PRESSURE: 72 MMHG | HEART RATE: 80 BPM | WEIGHT: 212 LBS | SYSTOLIC BLOOD PRESSURE: 129 MMHG | BODY MASS INDEX: 28.1 KG/M2 | HEIGHT: 73 IN | RESPIRATION RATE: 14 BRPM

## 2021-01-28 DIAGNOSIS — S40.852A: ICD-10-CM

## 2021-01-28 DIAGNOSIS — S50.852A FOREIGN BODY IN LEFT FOREARM, INITIAL ENCOUNTER: ICD-10-CM

## 2021-01-28 DIAGNOSIS — S51.812A LACERATION OF LEFT FOREARM, INITIAL ENCOUNTER: ICD-10-CM

## 2021-01-28 PROCEDURE — 99213 OFFICE O/P EST LOW 20 MIN: CPT | Performed by: ORTHOPAEDIC SURGERY

## 2021-01-28 ASSESSMENT — MIFFLIN-ST. JEOR: SCORE: 1945.51

## 2021-01-28 NOTE — LETTER
1/28/2021         RE: Art Reece  [address] [facility] [address]        Dear Colleague,    Thank you for referring your patient, Art Reece, to the [facility]. Please see a copy of my visit note below.    Art Reece is a 36 year old male who is seen in emergency room  follow-up for left forearm injury.  He was at work on 1/25/2021 and working on his vehicle.  There was a piece of steel that was lodged in his tire.  He hit it with a hammer and a piece of metal flew off and penetrated his shirt into his left forearm.  He found a fresh piece of metal missing from his hammer, so we think it is a hammer fragment.  X-ray in the emergency room showed foreign body in the forearm.  They attempted surgery for this in the emergency room but could not remove the fragment despite feeling that they could get a hold of it.  The wound was closed, and he was placed on antibiotic.  He is seen now for follow-up    Past Medical History:   Diagnosis Date     Back pain        History reviewed. No pertinent surgical history.    History reviewed. No pertinent family history.    Social History     Socioeconomic History     Marital status:      Spouse name: Not on file     Number of children: Not on file     Years of education: Not on file     Highest education level: Not on file   Occupational History     Not on file   Social Needs     Financial resource strain: Not on file     Food insecurity     Worry: Not on file     Inability: Not on file     Transportation needs     Medical: Not on file     Non-medical: Not on file   Tobacco Use     Smoking status: Current Every Day Smoker     Packs/day: 1.00     Smokeless tobacco: Never Used   Substance and Sexual Activity     Alcohol use: Yes     Alcohol/week: 20.0 standard drinks     Types: 24 Cans of beer per week     Comment: daily 3-5 beers     Drug use: No     Sexual activity: Yes     Partners: Female   Lifestyle     Physical activity      Days per week: Not on file     Minutes per session: Not on file     Stress: Not on file   Relationships     Social connections     Talks on phone: Not on file     Gets together: Not on file     Attends Mosque service: Not on file     Active member of club or organization: Not on file     Attends meetings of clubs or organizations: Not on file     Relationship status: Not on file     Intimate partner violence     Fear of current or ex partner: Not on file     Emotionally abused: Not on file     Physically abused: Not on file     Forced sexual activity: Not on file   Other Topics Concern     Not on file   Social History Narrative     Not on file       Current Outpatient Medications   Medication Sig Dispense Refill     cephALEXin (KEFLEX) 500 MG capsule Take 1 capsule (500 mg) by mouth 4 times daily for 7 days 28 capsule 0     ibuprofen (ADVIL,MOTRIN) 200 MG tablet Take 3 tablets (600 mg) by mouth every 8 hours as needed for pain (Patient taking differently: Take 400 mg by mouth every 8 hours as needed for pain ) 30 tablet 0       No Known Allergies    REVIEW OF SYSTEMS:  CONSTITUTIONAL:  NEGATIVE for fever, chills, change in weight, not feeling tired  SKIN:  NEGATIVE for worrisome rashes, no skin lumps, no skin ulcers and no non-healing wounds  EYES:  NEGATIVE for vision changes or irritation.  ENT/MOUTH:  NEGATIVE.  No hearing loss, no hoarseness, no difficulty swallowing.  RESP:  NEGATIVE. No cough or shortness of breath.  CV:  NEGATIVE for chest pain, palpitations or peripheral edema  GI:  NEGATIVE for nausea, abdominal pain, heartburn, or change in bowel habits  :  Negative. No dysuria, no hematuria  MUSCULOSKELETAL:  See HPI above  NEURO:  NEGATIVE . No headaches, no dizziness,  no numbness  ENDOCRINE:  NEGATIVE for temperature intolerance, skin/hair changes  HEME/ALLERGY/IMMUNE:  NEGATIVE for bleeding problems  PSYCHIATRIC:  NEGATIVE. no anxiety, no depression.     Exam:  Vitals: /72   Pulse 80   " Resp 14   Ht 1.854 m (6' 1\")   Wt 96.2 kg (212 lb)   BMI 27.97 kg/m    BMI= Body mass index is 27.97 kg/m .  Constitutional:  healthy, alert and no distress  Neuro: Alert and Oriented x 3, Sensation grossly WNL.  Psych: Affect normal   Respiratory: Breathing not labored.  Cardiovascular: normal peripheral pulses  Lymph: no adenopathy  Skin: Small laceration to left forearm.  This is closed with a single suture.  There is no significant erythema here.  No sign of active infection.  I do not feel the metallic fragment in the surrounding tissue.  He reports he could feel it about an inch above the laceration, but it is now moved by his report.  Sensation, motor and circulation are intact.    Assessment:  Foreign body in left forearm.  I told him this could be left in the forearm if it did not cause infection.  The downside is that this is  magnetic and would prevent him from having MRI scans in the future..  Considering all this, he would like the metal removed.  We will plan to do this in same-day surgery with a McKenzie block.  We will need C-arm to be sure we can find the fragment.  This is work-related        Again, thank you for allowing me to participate in the care of your patient.        Sincerely,        Kanu Martinez MD    "

## 2021-01-28 NOTE — PATIENT INSTRUCTIONS
SMOKING CESSATION  What's in cigarette smoke? - Cigarette smoke contains over 4,000 chemicals. Nicotine is one of the main ingredients which is an insecticide/herbicide. It is poisonous to our nervous system, increases blood clotting risk, and decreases the body's defenses to fight off infection. Another chemical is Carbon Monoxide is an asphyxiating gas that permanently binds to blood cells and blocks the transport of oxygen. This leads to tissue death and decreases your metabolism. Tar is a chemical that coats your lungs and trachea which impairs new oxygen coming in and carbon dioxide getting out of your body.   How does smoking impact surgery? - Smoking is particularly hazardous with regards to surgery. Surgery puts stress on the body and a smoker's body is already under strain from these chemicals. Putting the two together, especially for an elective surgery, could be a recipe for disaster. Smoking before and after surgery increases your risk of heart problems, slow wound healing, delayed bone healing, blood clots, wound infection and anesthesia complications.   What are the benefits of quitting? - In 20 minutes your blood pressure will drop back down to normal. In 8 hours the carbon monoxide (a toxic gas) levels in your blood stream will drop by half, and oxygen levels will return to normal. In 48 hours your chance of having a heart attack will have decreased. All nicotine will have left your body. Your sense of taste and smell will return to a normal level. In 72 hours your bronchial tubes will relax, and your energy levels will increase. In 2 weeks your circulation will increase, and it will continue to improve for the next 10 weeks.    Recommendations for elective surgery - Ideally, patients should quit smoking 8 weeks before and at least 2 weeks after elective surgery in order to avoid complications. Simply cutting back on the amount of cigarettes smoked per day does not offer any benefit or decrease the  risk of poor wound healing, heart problems, and infection. Smokers should also start taking Vitamin C and B for two weeks before surgery and two weeks after surgery.    Ways to Stop Smokin. Nicotine patches, lozenges, or gum  2. Support Groups  3. Medications (see below)    List of Medications:  1. Varenicline Tartrate (CHANTIX)   2. Bupropion HCL (WELLBUTRIN, ZYBAN) - note: make sure Wellbutrin is for smoking cessation and not other issues   3. Nicotine Patch (NICODERM)   4. Nicotine Inhaler (NICOTROL)   5. Nicotine Gum Nicotine Polacrilex   6. Nicotine Lozenge: Nicotine Polacrilex (COMMIT)   * [place] offers a smoking support group as well!  Please visit: [email or web address]  If you are interested in these, ask about getting a prescription or talk to your primary care doctor about what may be the best way for you to quit.       Surgery:  Foreign body removal left forearm.    Preop physical with primary physician is needed within 30 days of the surgery.  Nothing to eat or drink for 8 hours before surgery.  For same day surgery you need a ride.  Someone should stay with you for 12 hours afterward.  Stop blood thinners 5 days before surgery (aspirin, warfarin, anti-inflammatories).    You will need a Covid test before surgery.  They will call you to schedule that.    Surgery schedulers will call you to schedule surgery.    If you don't get a call in the next few days, then call [phone] to schedule your surgery for [place] or [phone] to schedule for [place].

## 2021-01-28 NOTE — PROGRESS NOTES
Art Reece is a 36 year old male who is seen in emergency room  follow-up for left forearm injury.  He was at work on 1/25/2021 and working on his vehicle.  There was a piece of steel that was lodged in his tire.  He hit it with a hammer and a piece of metal flew off and penetrated his shirt into his left forearm.  He found a fresh piece of metal missing from his hammer, so we think it is a hammer fragment.  X-ray in the emergency room showed foreign body in the forearm.  They attempted surgery for this in the emergency room but could not remove the fragment despite feeling that they could get a hold of it.  The wound was closed, and he was placed on antibiotic.  He is seen now for follow-up    Past Medical History:   Diagnosis Date     Back pain        History reviewed. No pertinent surgical history.    History reviewed. No pertinent family history.    Social History     Socioeconomic History     Marital status:      Spouse name: Not on file     Number of children: Not on file     Years of education: Not on file     Highest education level: Not on file   Occupational History     Not on file   Social Needs     Financial resource strain: Not on file     Food insecurity     Worry: Not on file     Inability: Not on file     Transportation needs     Medical: Not on file     Non-medical: Not on file   Tobacco Use     Smoking status: Current Every Day Smoker     Packs/day: 1.00     Smokeless tobacco: Never Used   Substance and Sexual Activity     Alcohol use: Yes     Alcohol/week: 20.0 standard drinks     Types: 24 Cans of beer per week     Comment: daily 3-5 beers     Drug use: No     Sexual activity: Yes     Partners: Female   Lifestyle     Physical activity     Days per week: Not on file     Minutes per session: Not on file     Stress: Not on file   Relationships     Social connections     Talks on phone: Not on file     Gets together: Not on file     Attends [employer] service: Not on file     Active  "member of club or organization: Not on file     Attends meetings of clubs or organizations: Not on file     Relationship status: Not on file     Intimate partner violence     Fear of current or ex partner: Not on file     Emotionally abused: Not on file     Physically abused: Not on file     Forced sexual activity: Not on file   Other Topics Concern     Not on file   Social History Narrative     Not on file       Current Outpatient Medications   Medication Sig Dispense Refill     cephALEXin (KEFLEX) 500 MG capsule Take 1 capsule (500 mg) by mouth 4 times daily for 7 days 28 capsule 0     ibuprofen (ADVIL,MOTRIN) 200 MG tablet Take 3 tablets (600 mg) by mouth every 8 hours as needed for pain (Patient taking differently: Take 400 mg by mouth every 8 hours as needed for pain ) 30 tablet 0       No Known Allergies    REVIEW OF SYSTEMS:  CONSTITUTIONAL:  NEGATIVE for fever, chills, change in weight, not feeling tired  SKIN:  NEGATIVE for worrisome rashes, no skin lumps, no skin ulcers and no non-healing wounds  EYES:  NEGATIVE for vision changes or irritation.  ENT/MOUTH:  NEGATIVE.  No hearing loss, no hoarseness, no difficulty swallowing.  RESP:  NEGATIVE. No cough or shortness of breath.  CV:  NEGATIVE for chest pain, palpitations or peripheral edema  GI:  NEGATIVE for nausea, abdominal pain, heartburn, or change in bowel habits  :  Negative. No dysuria, no hematuria  MUSCULOSKELETAL:  See HPI above  NEURO:  NEGATIVE . No headaches, no dizziness,  no numbness  ENDOCRINE:  NEGATIVE for temperature intolerance, skin/hair changes  HEME/ALLERGY/IMMUNE:  NEGATIVE for bleeding problems  PSYCHIATRIC:  NEGATIVE. no anxiety, no depression.     Exam:  Vitals: /72   Pulse 80   Resp 14   Ht 1.854 m (6' 1\")   Wt 96.2 kg (212 lb)   BMI 27.97 kg/m    BMI= Body mass index is 27.97 kg/m .  Constitutional:  healthy, alert and no distress  Neuro: Alert and Oriented x 3, Sensation grossly WNL.  Psych: Affect normal "   Respiratory: Breathing not labored.  Cardiovascular: normal peripheral pulses  Lymph: no adenopathy  Skin: Small laceration to left forearm.  This is closed with a single suture.  There is no significant erythema here.  No sign of active infection.  I do not feel the metallic fragment in the surrounding tissue.  He reports he could feel it about an inch above the laceration, but it is now moved by his report.  Sensation, motor and circulation are intact.    Assessment:  Foreign body in left forearm.  I told him this could be left in the forearm if it did not cause infection.  The downside is that this is  magnetic and would prevent him from having MRI scans in the future..  Considering all this, he would like the metal removed.  We will plan to do this in same-day surgery with a Cetronia block.  We will need C-arm to be sure we can find the fragment.  This is work-related

## 2021-01-29 ENCOUNTER — TELEPHONE (OUTPATIENT)
Dept: ORTHOPEDICS | Facility: CLINIC | Age: 36
End: 2021-01-29

## 2021-01-29 PROBLEM — S40.852A: Status: ACTIVE | Noted: 2021-01-29

## 2021-01-29 NOTE — TELEPHONE ENCOUNTER
Type of surgery: REMOVAL, FOREIGN BODY, UPPER EXTREMITY [[id number]]  Location of surgery: [facility]  Date and time of surgery: 2/12/21  Surgeon: Michelle  Pre-Op Appt Date: 2/9  Post-Op Appt Date: 2/18   Packet sent out: Yes  Pre-cert/Authorization completed:  Not Applicable  Date: na

## 2021-01-30 DIAGNOSIS — Z11.59 ENCOUNTER FOR SCREENING FOR OTHER VIRAL DISEASES: Primary | ICD-10-CM

## 2021-02-09 ENCOUNTER — OFFICE VISIT (OUTPATIENT)
Dept: FAMILY MEDICINE | Facility: CLINIC | Age: 36
End: 2021-02-09
Payer: OTHER MISCELLANEOUS

## 2021-02-09 VITALS
OXYGEN SATURATION: 98 % | HEIGHT: 73 IN | HEART RATE: 84 BPM | BODY MASS INDEX: 29.18 KG/M2 | SYSTOLIC BLOOD PRESSURE: 122 MMHG | RESPIRATION RATE: 16 BRPM | DIASTOLIC BLOOD PRESSURE: 68 MMHG | TEMPERATURE: 97.6 F | WEIGHT: 220.2 LBS

## 2021-02-09 DIAGNOSIS — F17.200 NICOTINE DEPENDENCE, UNCOMPLICATED, UNSPECIFIED NICOTINE PRODUCT TYPE: ICD-10-CM

## 2021-02-09 DIAGNOSIS — F10.20 UNCOMPLICATED ALCOHOL DEPENDENCE (H): ICD-10-CM

## 2021-02-09 DIAGNOSIS — Z01.818 PREOP GENERAL PHYSICAL EXAM: Primary | ICD-10-CM

## 2021-02-09 DIAGNOSIS — Z11.59 ENCOUNTER FOR SCREENING FOR OTHER VIRAL DISEASES: ICD-10-CM

## 2021-02-09 DIAGNOSIS — S50.852D FOREIGN BODY IN LEFT FOREARM, SUBSEQUENT ENCOUNTER: ICD-10-CM

## 2021-02-09 PROBLEM — S40.852A: Status: RESOLVED | Noted: 2021-01-29 | Resolved: 2021-02-09

## 2021-02-09 LAB
LABORATORY COMMENT REPORT: NORMAL
SARS-COV-2 RNA RESP QL NAA+PROBE: NEGATIVE
SARS-COV-2 RNA RESP QL NAA+PROBE: NORMAL
SPECIMEN SOURCE: NORMAL
SPECIMEN SOURCE: NORMAL

## 2021-02-09 PROCEDURE — 87635 SARS-COV-2 COVID-19 AMP PRB: CPT | Performed by: ORTHOPAEDIC SURGERY

## 2021-02-09 PROCEDURE — 99204 OFFICE O/P NEW MOD 45 MIN: CPT | Performed by: FAMILY MEDICINE

## 2021-02-09 ASSESSMENT — MIFFLIN-ST. JEOR: SCORE: 1982.7

## 2021-02-09 ASSESSMENT — PAIN SCALES - GENERAL: PAINLEVEL: MILD PAIN (2)

## 2021-02-09 NOTE — PATIENT INSTRUCTIONS
Preparing for Your Surgery  Getting started  A nurse will call you to review your health history and instructions. They will give you an arrival time based on your scheduled surgery time.  Please be ready to share the following:    Your doctor's clinic name and phone number    Your medical, surgical and anesthesia history    A list of allergies and sensitivities    A list of medicines, including herbal treatments and over-the-counter drugs    Whether the patient has a legal guardian (ask how to send us the papers in advance)  If you have a child who's having surgery, please ask for a copy of Preparing for Your Child's Surgery.    Preparing for surgery    Within 30 days of surgery: Have a pre-op exam (sometimes called an H&P, or History and Physical). This can be done at a clinic or pre-operative center.  ? If you're having a , you may not need this exam. Talk to your care team    At your pre-op exam, talk to your care team about all medicines you take. If you need to stop any medicines before surgery, ask when to start taking them again.  ? We do this for your safety. Many medicines can make you bleed too much during surgery. Some change how well surgery (anesthesia) drugs work.    Call your insurance company to let them know you're having surgery. (If you don't have insurance, call [phone].)    Call your clinic if there's any change in your health. This includes signs of a cold or flu (sore throat, runny nose, cough, rash, fever). It also includes a scrape or scratch near the surgery site.    If you have questions on the day of surgery, call your hospital or surgery center.  Eating and drinking guidelines  For your safety: Unless your surgeon tells you otherwise, follow the guidelines below.    Eat and drink as usual until 8 hours before surgery. After that, no food or milk.    Drink clear liquids until 2 hours before surgery. These are liquids you can see through, like water, Gatorade and Propel  Water. You may also have black coffee and tea (no cream or milk).    Nothing by mouth within 2 hours of surgery. This includes gum, candy and breath mints.    If you drink, stop drinking alcohol the night before surgery.    If your care team tells you to take medicine on the morning of surgery, it's okay to take it with a sip of water.  Preventing infection    Shower or bathe the night before and morning of your surgery. Follow the instructions your clinic gave you. (If no instructions, use regular soap.)    Don't shave or clip hair near your surgery site. We'll remove the hair if needed.    Don't smoke or vape the morning of surgery. You may chew nicotine gum up to 2 hours before surgery. A nicotine patch is okay.  ? Note: Some surgeries require you to completely quit smoking and nicotine. Check with your surgeon.    Your care team will make every effort to keep you safe from infection. We will:  ? Clean our hands often with soap and water (or an alcohol-based hand rub).  ? Clean the skin at your surgery site with a special soap that kills germs.  ? Give you a special gown to keep you warm. (Cold raises the risk of infection.)  ? Wear special hair covers, masks, gowns and gloves during surgery.  ? Give antibiotic medicine, if prescribed. Not all surgeries need antibiotics.  What to bring on the day of surgery    Photo ID and insurance card    Copy of your health care directive, if you have one    Glasses and hearing aides (bring cases)  ? You can't wear contacts during surgery    Inhaler and eye drops, if you use them (tell us about these when you arrive)    CPAP machine or breathing device, if you use them    A few personal items, if spending the night    If you have . . .  ? A pacemaker or ICD (cardiac defibrillator): Bring the ID card.  ? An implanted stimulator: Bring the remote control.  ? A legal guardian: Bring a copy of the certified (court-stamped) guardianship papers.  Please remove any jewelry, including  body piercings. Leave jewelry and other valuables at home.  If you're going home the day of surgery  Important: If you don't follow the rules below, we must cancel your surgery.     Arrange for someone to drive you home after surgery. You may not drive, take a taxi or take public transportation by yourself (unless you'll have local anesthesia only).    Arrange for a responsible adult to stay with you overnight. If you don't, we may keep you in the hospital overnight, and you may need to pay the costs yourself.  Questions?   If you have any questions for your care team, list them here: _________________________________________________________________________________________________________________________________________________________________________________________________________________________________________________________________________________________________________________________  For informational purposes only. Not to replace the advice of your health care provider. Copyright   2003, 2019 [facility]. All rights reserved. Clinically reviewed by Renetta Sousa MD. SMART[employer] 359916 - REV 4/20.      The Benefits of Living Tobacco-Free  What do you want to improve in your life by quitting tobacco? Whether you smoke cigarettes, e-cigarettes, cigars, or a pipe, or use smokeless tobacco, there are many reasons to quit. Check off some reasons you want to be tobacco-free.   Health benefits  ___  I want to breathe without coughing or feeling short of breath.   ___  I want to reduce my risk for lung cancer, oral cancer, heart disease, bone problems such as osteoporosis and fractures, and chronic lung disease. Or reduce my risk for a serious lung injury called EVALI that may happen from vaping or using e-cigarettes.   ___  I want to have fewer wrinkles and softer skin.   ___  I want to improve my sense of taste and smell.   ___  For pregnant women: I want to reduce my risk for a miscarriage,  stillbirth,  birth, or a low-birth-weight baby.   Personal benefits  ___  I want to be able to walk, go up stairs, and exercise without becoming out of breath.   ___  I want to feel more in control of my life.   ___  I want to have better-smelling hair, clothes, home, and car.   ___  I want to save time by not having to take smoke breaks, buy tobacco products, or hunt for a light.   ___  I want to have whiter teeth and fresher breath.   Family benefits  ___  I want to reduce my child's respiratory tract infections.   ___  I want to set a healthy example for my child.   ___  I want to have the energy needed to play with my children and not become out-of-breath   ___  I want to reduce my family s cancer risk.   Financial benefits  ___  I want to save hundreds of dollars each year that would be spent on tobacco products.   ___  I want to save money on medical bills.   ___  I want to save money on life, health, and car insurance premiums.   How much do you spend?  A tobacco habit is expensive. Do you know how much you spend on tobacco each year? Fill in the blanks below to find out:   A. How much do you pay for 1 pack of cigarettes, 1 cigar, 1 pouch of pipe tobacco, or 1 can of smokeless tobacco? $________   B. How many packs, cigars, pouches, or cans do you use each day? _______________   C. Multiply answer A with answer B:___________________  D. Multiply answer C with 365: $___________________. This is your yearly cost of using tobacco.   Besides the cost of buying tobacco, there are other costs. These include:     Costs of cleaning clothing, furniture, and car    Replacement costs for clothing and furniture    Medical costs for tobacco-related illnesses    Missed days of work because of illness    Higher health, life, and car insurance premiums  Get help     QuitNow,  www.cdc.gov/tobacco/quit_smoking/, 800-QUIT-NOW (275-146-5948).    QuitLine,  www.smokefree.gov, 877-44U-QUIT (572-797-3979).    Kam last  reviewed this educational content on 4/1/2020 2000-2020 [employer]. [address] [place] [address], [place] [address]. All rights reserved. This information is not intended as a substitute for professional medical care. Always follow your healthcare professional's instructions.      You are not taking any any medication chronically    No aspirin or NSAID (Ibuprofen, Aleve, Motrin, Naproxen, ect.) for 5 days before the procedure. Ok to take Tylenol as needed for pain  No drinking alcohol or tobacco smoking for 24 hrs before the surgery  Covid test as scheduled  Take shower with the provided shampoo the day before and am of procedure  Nothing to eat 8 hrs, but may drink clear liquid up to 2 hrs before the procedure

## 2021-02-09 NOTE — H&P (VIEW-ONLY)
[address] [address]  Phone: [phone]  Fax: [phone]  Primary Provider: No Ref-Primary, Physician  Pre-op Performing Provider: CHRISTINA PORTER      PREOPERATIVE EVALUATION:  Today's date: 2/9/2021    Art Reece is a 36 year old male who presents for a preoperative evaluation.    Surgical Information:  Surgery/Procedure: Left forearm foreign body removal  Surgery Location: [facility]  Surgeon: Michelle  Surgery Date: 02/12/21  Time of Surgery: 1200  Where patient plans to recover: At home with family  Fax number for surgical facility: Note does not need to be faxed, will be available electronically in Epic.    Type of Anesthesia Anticipated: [facility]    Assessment & Plan     The proposed surgical procedure is considered LOW risk.      ICD-10-CM    1. Preop general physical exam  Z01.818    2. Foreign body in left forearm, subsequent encounter  S50.852D    3. Uncomplicated alcohol dependence (H)  F10.20    4. Nicotine dependence, uncomplicated, unspecified nicotine product type  F17.200 SMOKING CESSATION COUNSELING 3-10 MIN          Risks and Recommendations:  The patient has the following additional risks and recommendations for perioperative complications:  Cardiovascular:   No cardiovascular risks identified.    Diabetes:  He does not have diabetes.    Pulmonary:    - Incentive spirometry post-op as needed due to being a smoker for years.   - Consider Respiratory Therapy (Respiratory Care IP Consult) post-op as needed for wheezing, shortness of breath, deoxygenation or coughing due to tobacco smoking   - Active nicotine user, advised smoking cessation   - Oxygen supplement during and after procedure to keep O2 sat above 94%.    Obstructive Sleep Apnea:   No history of sleep apnea.  No risk for sleep apnea identified.    Anemia/Bleeding/Clotting:      - No history of bleeding history   - No history of DVT or PE, DVT/PE prophylaxis per surgeon's  recommendation/desire   - No history of blood transfusion, he is okay to be transfused if necessary    Social and Substance:    - Alcohol consumption daily and may risk of withdrawal.     - Active nicotine user, advised smoking cessation   - Denied of drug use    Infection:    - No history of MRSA   - Prophylactic antibody per surgeon's recommendation/desire   - UTD for tetanus vaccination   - Covid screening per surgeon's ordered   - Shower with provided antimicrobial shampoo the day before and a.m. of the procedure       Medication Instructions:  Patient is to take all scheduled medications on the day of surgery EXCEPT for modifications listed below:   You are not taking any any medication chronically    No aspirin or NSAID (Ibuprofen, Aleve, Motrin, Naproxen, ect.) for 5 days before the procedure. Ok to take Tylenol as needed for pain  No drinking alcohol or tobacco smoking for 24 hrs before the surgery  Covid test as scheduled  Take shower with the provided shampoo the day before and am of procedure  Nothing to eat 8 hrs, but may drink clear liquid up to 2 hrs before the proceudre    RECOMMENDATION:  APPROVAL GIVEN to proceed with proposed procedure, without further diagnostic evaluation.      27 minutes spent on the date of the encounter doing chart review, patient visit and documentation         Subjective     HPI related to upcoming procedure:     Preop Questions 2/9/2021   1. Have you ever had a heart attack or stroke? No   2. Have you ever had surgery on your heart or blood vessels, such as a stent placement, a coronary artery bypass, or surgery on an artery in your head, neck, heart, or legs? No   3. Do you have chest pain with activity? No   4. Do you have a history of  heart failure? No   5. Do you currently have a cold, bronchitis or symptoms of other infection? No   6. Do you have a cough, shortness of breath, or wheezing? No   7. Do you or anyone in your family have previous history of blood clots?  UNKNOWN    8. Do you or does anyone in your family have a serious bleeding problem such as prolonged bleeding following surgeries or cuts? No   9. Have you ever had problems with anemia or been told to take iron pills? No   10. Have you had any abnormal blood loss such as black, tarry or bloody stools? No   11. Have you ever had a blood transfusion? No   12. Are you willing to have a blood transfusion if it is medically needed before, during, or after your surgery? Yes   13. Have you or any of your relatives ever had problems with anesthesia? No   14. Do you have sleep apnea, excessive snoring or daytime drowsiness? No   15. Do you have any artifical heart valves or other implanted medical devices like a pacemaker, defibrillator, or continuous glucose monitor? No   16. Do you have artificial joints? No   17. Are you allergic to latex? No       Health Care Directive:  Patient does not have a Health Care Directive or Living Will: Discussed advance care planning with patient; however, patient declined at this time.    Preoperative Review of :  Did not review - not on high risk medication      Art is here today for pre-op physical. He is scheduled for removal of the metal on his left forearm on 2/22/21 with Dr. Orlando at [facility].  It is expected to be the same day procedure with MAC and local anesthesia. No personal or family history of anesthesia complication or pre-matured CAD or MI.  Has not been on steroid orally in the last 6 months.  Not taking Aspirin or other form of blood thinner.  Last dose of NSAID was this morning.       Generally is healthy - not taking any medication chronically.  He drinks 2-3 drinks daily and smoke about 11 ppd for years    He generally is doing well and has no concern today. No headache or dizziness. No running nose, nasal congestion, ST, coughing, fever or chill.  No chest pain or SOB.  No N/V/D/C and denied of having problem with urination.  He smoke 1 ppd for years  "but denied of having breathing problem.  No history of asthma or COPD.      Review of Systems  Constitutional, neuro, ENT, endocrine, pulmonary, cardiac, gastrointestinal, genitourinary, musculoskeletal, integument and psychiatric systems are negative, except as otherwise noted.    Patient Active Problem List    Diagnosis Date Noted     Foreign body in upper extremity, left, initial encounter 01/29/2021     Priority: Medium     Added automatically from request for surgery [id number]       Foreign body in left forearm 01/28/2021     Priority: Medium      Past Medical History:   Diagnosis Date     Back pain      History reviewed. No pertinent surgical history.  Current Outpatient Medications   Medication Sig Dispense Refill     ibuprofen (ADVIL,MOTRIN) 200 MG tablet Take 3 tablets (600 mg) by mouth every 8 hours as needed for pain (Patient taking differently: Take 400 mg by mouth every 8 hours as needed for pain ) 30 tablet 0       No Known Allergies     Social History     Tobacco Use     Smoking status: Current Every Day Smoker     Packs/day: 1.00     Smokeless tobacco: Never Used   Substance Use Topics     Alcohol use: Yes     Alcohol/week: 20.0 standard drinks     Types: 24 Cans of beer per week     Comment: daily 3-5 beers     Family History   Problem Relation Age of Onset     Arthritis Mother      Prostate Cancer Father      Unknown/Adopted Maternal Grandmother      Unknown/Adopted Maternal Grandfather      Unknown/Adopted Paternal Grandmother      Unknown/Adopted Paternal Grandfather      Coronary Artery Disease Brother         congenital heart problem     No Known Problems Sister      No Known Problems Daughter      No Known Problems Brother      No Known Problems Brother      No Known Problems Sister      No Known Problems Daughter      History   Drug Use No         Objective     /68   Pulse 84   Temp 97.6  F (36.4  C) (Temporal)   Resp 16   Ht 1.854 m (6' 1\")   Wt 99.9 kg (220 lb 3.2 oz)   SpO2 98%  "  BMI 29.05 kg/m      Physical Exam      GENERAL APPEARANCE: healthy, alert and no distress     EYES: EOMI,- PERRL     HENT: ear canals and TM's normal. Nares are non-congested. Oropharynx is pink and moist, no ulcers or lesions. No tender with palpation to the sinuses.     NECK: no adenopathy, no asymmetry.  No tender with palpation to the cervical spine and its para-spinous muscle bilaterally.     RESP: lungs clear to auscultation - no rales, rhonchi or wheezes     CV: regular rates and rhythm and no murmur.     ABDOMEN:  soft, nontender, nondistended with no palpable masses and bowel sounds normal     MS: extremities normal- no gross deformities noted.  No edema.  All 4 extremities are equally in strength.     NEURO: Normal strength and tone,  mentation intact and speech normal.  No focal neurological deficit     PSYCH: mentation appears normal. and affect normal/bright           No results for input(s): HGB, PLT, INR, NA, POTASSIUM, CR, A1C in the last 63464 hours.     Diagnostics:  No labs were ordered during this visit.   No EKG required for low risk surgery (cataract, skin procedure, breast biopsy, etc).    Revised Cardiac Risk Index (RCRI):  The patient has the following serious cardiovascular risks for perioperative complications:   - No serious cardiac risks = 0 points     RCRI Interpretation: 0 points: Class I (very low risk - 0.4% complication rate)    Signed Electronically by: Camila Wolfe Mai, MD  Copy of this evaluation report is provided to requesting physician.    [facility]    Revised Cardiac Risk Index

## 2021-02-09 NOTE — PROGRESS NOTES
[address] [address]  Phone: [phone]  Fax: [phone]  Primary Provider: No Ref-Primary, Physician  Pre-op Performing Provider: CHRISTINA PORTER      PREOPERATIVE EVALUATION:  Today's date: 2/9/2021    Art Reece is a 36 year old male who presents for a preoperative evaluation.    Surgical Information:  Surgery/Procedure: Left forearm foreign body removal  Surgery Location: [facility]  Surgeon: Michelle  Surgery Date: 02/12/21  Time of Surgery: 1200  Where patient plans to recover: At home with family  Fax number for surgical facility: Note does not need to be faxed, will be available electronically in Epic.    Type of Anesthesia Anticipated: [place]    Assessment & Plan     The proposed surgical procedure is considered LOW risk.      ICD-10-CM    1. Preop general physical exam  Z01.818    2. Foreign body in left forearm, subsequent encounter  S50.852D    3. Uncomplicated alcohol dependence (H)  F10.20    4. Nicotine dependence, uncomplicated, unspecified nicotine product type  F17.200 SMOKING CESSATION COUNSELING 3-10 MIN          Risks and Recommendations:  The patient has the following additional risks and recommendations for perioperative complications:  Cardiovascular:   No cardiovascular risks identified.    Diabetes:  He does not have diabetes.    Pulmonary:    - Incentive spirometry post-op as needed due to being a smoker for years.   - Consider Respiratory Therapy (Respiratory Care IP Consult) post-op as needed for wheezing, shortness of breath, deoxygenation or coughing due to tobacco smoking   - Active nicotine user, advised smoking cessation   - Oxygen supplement during and after procedure to keep O2 sat above 94%.    Obstructive Sleep Apnea:   No history of sleep apnea.  No risk for sleep apnea identified.    Anemia/Bleeding/Clotting:      - No history of bleeding history   - No history of DVT or PE, DVT/PE prophylaxis per surgeon's  recommendation/desire   - No history of blood transfusion, he is okay to be transfused if necessary    Social and Substance:    - Alcohol consumption daily and may risk of withdrawal.     - Active nicotine user, advised smoking cessation   - Denied of drug use    Infection:    - No history of MRSA   - Prophylactic antibody per surgeon's recommendation/desire   - UTD for tetanus vaccination   - Covid screening per surgeon's ordered   - Shower with provided antimicrobial shampoo the day before and a.m. of the procedure       Medication Instructions:  Patient is to take all scheduled medications on the day of surgery EXCEPT for modifications listed below:   You are not taking any any medication chronically    No aspirin or NSAID (Ibuprofen, Aleve, Motrin, Naproxen, ect.) for 5 days before the procedure. Ok to take Tylenol as needed for pain  No drinking alcohol or tobacco smoking for 24 hrs before the surgery  Covid test as scheduled  Take shower with the provided shampoo the day before and am of procedure  Nothing to eat 8 hrs, but may drink clear liquid up to 2 hrs before the proceudre    RECOMMENDATION:  APPROVAL GIVEN to proceed with proposed procedure, without further diagnostic evaluation.      27 minutes spent on the date of the encounter doing chart review, patient visit and documentation         Subjective     HPI related to upcoming procedure:     Preop Questions 2/9/2021   1. Have you ever had a heart attack or stroke? No   2. Have you ever had surgery on your heart or blood vessels, such as a stent placement, a coronary artery bypass, or surgery on an artery in your head, neck, heart, or legs? No   3. Do you have chest pain with activity? No   4. Do you have a history of  heart failure? No   5. Do you currently have a cold, bronchitis or symptoms of other infection? No   6. Do you have a cough, shortness of breath, or wheezing? No   7. Do you or anyone in your family have previous history of blood clots?  UNKNOWN    8. Do you or does anyone in your family have a serious bleeding problem such as prolonged bleeding following surgeries or cuts? No   9. Have you ever had problems with anemia or been told to take iron pills? No   10. Have you had any abnormal blood loss such as black, tarry or bloody stools? No   11. Have you ever had a blood transfusion? No   12. Are you willing to have a blood transfusion if it is medically needed before, during, or after your surgery? Yes   13. Have you or any of your relatives ever had problems with anesthesia? No   14. Do you have sleep apnea, excessive snoring or daytime drowsiness? No   15. Do you have any artifical heart valves or other implanted medical devices like a pacemaker, defibrillator, or continuous glucose monitor? No   16. Do you have artificial joints? No   17. Are you allergic to latex? No       Health Care Directive:  Patient does not have a Health Care Directive or Living Will: Discussed advance care planning with patient; however, patient declined at this time.    Preoperative Review of :  Did not review - not on high risk medication      Art is here today for pre-op physical. He is scheduled for removal of the metal on his left forearm on 2/22/21 with Dr. Orlando at [facility].  It is expected to be the same day procedure with MAC and local anesthesia. No personal or family history of anesthesia complication or pre-matured CAD or MI.  Has not been on steroid orally in the last 6 months.  Not taking Aspirin or other form of blood thinner.  Last dose of NSAID was this morning.       Generally is healthy - not taking any medication chronically.  He drinks 2-3 drinks daily and smoke about 11 ppd for years    He generally is doing well and has no concern today. No headache or dizziness. No running nose, nasal congestion, ST, coughing, fever or chill.  No chest pain or SOB.  No N/V/D/C and denied of having problem with urination.  He smoke 1 ppd for years  "but denied of having breathing problem.  No history of asthma or COPD.      Review of Systems  Constitutional, neuro, ENT, endocrine, pulmonary, cardiac, gastrointestinal, genitourinary, musculoskeletal, integument and psychiatric systems are negative, except as otherwise noted.    Patient Active Problem List    Diagnosis Date Noted     Foreign body in upper extremity, left, initial encounter 01/29/2021     Priority: Medium     Added automatically from request for surgery [id number]       Foreign body in left forearm 01/28/2021     Priority: Medium      Past Medical History:   Diagnosis Date     Back pain      History reviewed. No pertinent surgical history.  Current Outpatient Medications   Medication Sig Dispense Refill     ibuprofen (ADVIL,MOTRIN) 200 MG tablet Take 3 tablets (600 mg) by mouth every 8 hours as needed for pain (Patient taking differently: Take 400 mg by mouth every 8 hours as needed for pain ) 30 tablet 0       No Known Allergies     Social History     Tobacco Use     Smoking status: Current Every Day Smoker     Packs/day: 1.00     Smokeless tobacco: Never Used   Substance Use Topics     Alcohol use: Yes     Alcohol/week: 20.0 standard drinks     Types: 24 Cans of beer per week     Comment: daily 3-5 beers     Family History   Problem Relation Age of Onset     Arthritis Mother      Prostate Cancer Father      Unknown/Adopted Maternal Grandmother      Unknown/Adopted Maternal Grandfather      Unknown/Adopted Paternal Grandmother      Unknown/Adopted Paternal Grandfather      Coronary Artery Disease Brother         congenital heart problem     No Known Problems Sister      No Known Problems Daughter      No Known Problems Brother      No Known Problems Brother      No Known Problems Sister      No Known Problems Daughter      History   Drug Use No         Objective     /68   Pulse 84   Temp 97.6  F (36.4  C) (Temporal)   Resp 16   Ht 1.854 m (6' 1\")   Wt 99.9 kg (220 lb 3.2 oz)   SpO2 98%  "  BMI 29.05 kg/m      Physical Exam      GENERAL APPEARANCE: healthy, alert and no distress     EYES: EOMI,- PERRL     HENT: ear canals and TM's normal. Nares are non-congested. Oropharynx is pink and moist, no ulcers or lesions. No tender with palpation to the sinuses.     NECK: no adenopathy, no asymmetry.  No tender with palpation to the cervical spine and its para-spinous muscle bilaterally.     RESP: lungs clear to auscultation - no rales, rhonchi or wheezes     CV: regular rates and rhythm and no murmur.     ABDOMEN:  soft, nontender, nondistended with no palpable masses and bowel sounds normal     MS: extremities normal- no gross deformities noted.  No edema.  All 4 extremities are equally in strength.     NEURO: Normal strength and tone,  mentation intact and speech normal.  No focal neurological deficit     PSYCH: mentation appears normal. and affect normal/bright           No results for input(s): HGB, PLT, INR, NA, POTASSIUM, CR, A1C in the last 98248 hours.     Diagnostics:  No labs were ordered during this visit.   No EKG required for low risk surgery (cataract, skin procedure, breast biopsy, etc).    Revised Cardiac Risk Index (RCRI):  The patient has the following serious cardiovascular risks for perioperative complications:   - No serious cardiac risks = 0 points     RCRI Interpretation: 0 points: Class I (very low risk - 0.4% complication rate)    Signed Electronically by: Camila Wolfe Mai, MD  Copy of this evaluation report is provided to requesting physician.    [facility] Guidelines    Revised Cardiac Risk Index

## 2021-02-10 PROBLEM — S40.852A: Status: ACTIVE | Noted: 2021-01-29

## 2021-02-12 ENCOUNTER — APPOINTMENT (OUTPATIENT)
Dept: GENERAL RADIOLOGY | Facility: CLINIC | Age: 36
End: 2021-02-12
Attending: ORTHOPAEDIC SURGERY
Payer: OTHER MISCELLANEOUS

## 2021-02-12 ENCOUNTER — HOSPITAL ENCOUNTER (OUTPATIENT)
Facility: CLINIC | Age: 36
Discharge: HOME | End: 2021-02-12
Attending: ORTHOPAEDIC SURGERY | Admitting: ORTHOPAEDIC SURGERY
Payer: OTHER MISCELLANEOUS

## 2021-02-12 ENCOUNTER — ANESTHESIA (OUTPATIENT)
Dept: SURGERY | Facility: CLINIC | Age: 36
End: 2021-02-12
Payer: OTHER MISCELLANEOUS

## 2021-02-12 ENCOUNTER — ANESTHESIA EVENT (OUTPATIENT)
Dept: SURGERY | Facility: CLINIC | Age: 36
End: 2021-02-12
Payer: OTHER MISCELLANEOUS

## 2021-02-12 VITALS
TEMPERATURE: 97.7 F | RESPIRATION RATE: 16 BRPM | SYSTOLIC BLOOD PRESSURE: 112 MMHG | DIASTOLIC BLOOD PRESSURE: 71 MMHG | OXYGEN SATURATION: 99 %

## 2021-02-12 DIAGNOSIS — S50.852A FOREIGN BODY IN FOREARM, LEFT, INITIAL ENCOUNTER: ICD-10-CM

## 2021-02-12 DIAGNOSIS — S40.852A: ICD-10-CM

## 2021-02-12 PROCEDURE — 370N000017 HC ANESTHESIA TECHNICAL FEE, PER MIN: Performed by: ORTHOPAEDIC SURGERY

## 2021-02-12 PROCEDURE — 258N000003 HC RX IP 258 OP 636: Performed by: NURSE ANESTHETIST, CERTIFIED REGISTERED

## 2021-02-12 PROCEDURE — 999N000179 XR SURGERY CARM FLUORO LESS THAN 5 MIN W STILLS: Mod: TC

## 2021-02-12 PROCEDURE — 360N000082 HC SURGERY LEVEL 2 W/ FLUORO, PER MIN: Performed by: ORTHOPAEDIC SURGERY

## 2021-02-12 PROCEDURE — 250N000009 HC RX 250: Performed by: NURSE ANESTHETIST, CERTIFIED REGISTERED

## 2021-02-12 PROCEDURE — 250N000011 HC RX IP 250 OP 636: Performed by: NURSE ANESTHETIST, CERTIFIED REGISTERED

## 2021-02-12 PROCEDURE — 710N000012 HC RECOVERY PHASE 2, PER MINUTE: Performed by: ORTHOPAEDIC SURGERY

## 2021-02-12 PROCEDURE — 99213 OFFICE O/P EST LOW 20 MIN: CPT | Performed by: ORTHOPAEDIC SURGERY

## 2021-02-12 PROCEDURE — 73090 X-RAY EXAM OF FOREARM: CPT

## 2021-02-12 PROCEDURE — 999N000063 XR FOREARM PORT LT 2 VW: Mod: LT

## 2021-02-12 PROCEDURE — 250N000011 HC RX IP 250 OP 636: Performed by: ORTHOPAEDIC SURGERY

## 2021-02-12 PROCEDURE — 999N000141 HC STATISTIC PRE-PROCEDURE NURSING ASSESSMENT: Performed by: ORTHOPAEDIC SURGERY

## 2021-02-12 RX ORDER — ONDANSETRON 4 MG/1
4 TABLET, ORALLY DISINTEGRATING ORAL EVERY 30 MIN PRN
Status: DISCONTINUED | OUTPATIENT
Start: 2021-02-12 | End: 2021-02-12 | Stop reason: HOSPADM

## 2021-02-12 RX ORDER — NALOXONE HYDROCHLORIDE 0.4 MG/ML
0.2 INJECTION, SOLUTION INTRAMUSCULAR; INTRAVENOUS; SUBCUTANEOUS
Status: DISCONTINUED | OUTPATIENT
Start: 2021-02-12 | End: 2021-02-12 | Stop reason: HOSPADM

## 2021-02-12 RX ORDER — NALOXONE HYDROCHLORIDE 0.4 MG/ML
0.4 INJECTION, SOLUTION INTRAMUSCULAR; INTRAVENOUS; SUBCUTANEOUS
Status: DISCONTINUED | OUTPATIENT
Start: 2021-02-12 | End: 2021-02-12 | Stop reason: HOSPADM

## 2021-02-12 RX ORDER — ONDANSETRON 2 MG/ML
4 INJECTION INTRAMUSCULAR; INTRAVENOUS EVERY 30 MIN PRN
Status: DISCONTINUED | OUTPATIENT
Start: 2021-02-12 | End: 2021-02-12 | Stop reason: HOSPADM

## 2021-02-12 RX ORDER — PROPOFOL 10 MG/ML
INJECTION, EMULSION INTRAVENOUS CONTINUOUS PRN
Status: DISCONTINUED | OUTPATIENT
Start: 2021-02-12 | End: 2021-02-12

## 2021-02-12 RX ORDER — SODIUM CHLORIDE, SODIUM LACTATE, POTASSIUM CHLORIDE, CALCIUM CHLORIDE 600; 310; 30; 20 MG/100ML; MG/100ML; MG/100ML; MG/100ML
INJECTION, SOLUTION INTRAVENOUS CONTINUOUS
Status: DISCONTINUED | OUTPATIENT
Start: 2021-02-12 | End: 2021-02-12 | Stop reason: HOSPADM

## 2021-02-12 RX ORDER — CEFAZOLIN SODIUM 1 G/3ML
1 INJECTION, POWDER, FOR SOLUTION INTRAMUSCULAR; INTRAVENOUS SEE ADMIN INSTRUCTIONS
Status: DISCONTINUED | OUTPATIENT
Start: 2021-02-12 | End: 2021-02-12 | Stop reason: HOSPADM

## 2021-02-12 RX ORDER — LIDOCAINE 40 MG/G
CREAM TOPICAL
Status: DISCONTINUED | OUTPATIENT
Start: 2021-02-12 | End: 2021-02-12 | Stop reason: HOSPADM

## 2021-02-12 RX ORDER — LIDOCAINE HYDROCHLORIDE 20 MG/ML
INJECTION, SOLUTION INFILTRATION; PERINEURAL PRN
Status: DISCONTINUED | OUTPATIENT
Start: 2021-02-12 | End: 2021-02-12

## 2021-02-12 RX ORDER — MEPERIDINE HYDROCHLORIDE 25 MG/ML
12.5 INJECTION INTRAMUSCULAR; INTRAVENOUS; SUBCUTANEOUS
Status: DISCONTINUED | OUTPATIENT
Start: 2021-02-12 | End: 2021-02-12 | Stop reason: HOSPADM

## 2021-02-12 RX ORDER — CEFAZOLIN SODIUM 2 G/100ML
2 INJECTION, SOLUTION INTRAVENOUS
Status: COMPLETED | OUTPATIENT
Start: 2021-02-12 | End: 2021-02-12

## 2021-02-12 RX ORDER — LIDOCAINE HYDROCHLORIDE 5 MG/ML
INJECTION, SOLUTION INFILTRATION; INTRAVENOUS PRN
Status: DISCONTINUED | OUTPATIENT
Start: 2021-02-12 | End: 2021-02-12

## 2021-02-12 RX ORDER — FENTANYL CITRATE 50 UG/ML
25-50 INJECTION, SOLUTION INTRAMUSCULAR; INTRAVENOUS
Status: DISCONTINUED | OUTPATIENT
Start: 2021-02-12 | End: 2021-02-12 | Stop reason: HOSPADM

## 2021-02-12 RX ORDER — ONDANSETRON 2 MG/ML
INJECTION INTRAMUSCULAR; INTRAVENOUS PRN
Status: DISCONTINUED | OUTPATIENT
Start: 2021-02-12 | End: 2021-02-12

## 2021-02-12 RX ADMIN — SODIUM CHLORIDE, POTASSIUM CHLORIDE, SODIUM LACTATE AND CALCIUM CHLORIDE: 600; 310; 30; 20 INJECTION, SOLUTION INTRAVENOUS at 11:19

## 2021-02-12 RX ADMIN — LIDOCAINE HYDROCHLORIDE 0.1 ML: 10 INJECTION, SOLUTION EPIDURAL; INFILTRATION; INTRACAUDAL; PERINEURAL at 11:17

## 2021-02-12 RX ADMIN — CEFAZOLIN SODIUM 2 G: 2 INJECTION, SOLUTION INTRAVENOUS at 12:32

## 2021-02-12 RX ADMIN — LIDOCAINE HYDROCHLORIDE 0.1 ML: 10 INJECTION, SOLUTION EPIDURAL; INFILTRATION; INTRACAUDAL; PERINEURAL at 11:19

## 2021-02-12 RX ADMIN — PROPOFOL 125 MCG/KG/MIN: 10 INJECTION, EMULSION INTRAVENOUS at 12:29

## 2021-02-12 RX ADMIN — ONDANSETRON 4 MG: 2 INJECTION INTRAMUSCULAR; INTRAVENOUS at 12:53

## 2021-02-12 RX ADMIN — LIDOCAINE HYDROCHLORIDE 50 ML: 5 INJECTION, SOLUTION INFILTRATION at 12:34

## 2021-02-12 RX ADMIN — SODIUM CHLORIDE, POTASSIUM CHLORIDE, SODIUM LACTATE AND CALCIUM CHLORIDE: 600; 310; 30; 20 INJECTION, SOLUTION INTRAVENOUS at 12:28

## 2021-02-12 RX ADMIN — MIDAZOLAM 2 MG: 1 INJECTION INTRAMUSCULAR; INTRAVENOUS at 12:26

## 2021-02-12 ASSESSMENT — LIFESTYLE VARIABLES: TOBACCO_USE: 1

## 2021-02-12 NOTE — BRIEF OP NOTE
[facility] Brief Operative Note    Pre-operative diagnosis: Foreign body in upper extremity, left, initial encounter [S40.447M]   Post-operative diagnosis No foreign body located.   Procedure: Procedure(s):  Exam Under Anesthesia, xray   Surgeon(s): Surgeon(s) and Role:     * Kanu Martinez MD - Primary     * Meenakshi Atwood PA-C - Assisting   Estimated blood loss: 0 cc    Specimens: * No specimens in log *   Findings: No foreign body found by C-arm or regular x-ray.

## 2021-02-12 NOTE — ANESTHESIA CARE TRANSFER NOTE
Patient: Art Reece    Procedure(s):  Exam Under Anesthesia, xray    Diagnosis: Foreign body in upper extremity, left, initial encounter [S40.989R]  Diagnosis Additional Information: No value filed.    Anesthesia Type:   IV Regional Anesthesia     Note:    Oropharynx: oropharynx clear of all foreign objects  Level of Consciousness: awake  Oxygen Supplementation: room air    Independent Airway: airway patency satisfactory and stable  Dentition: dentition unchanged    Report to RN Given: handoff report given  Patient transferred to: Phase II    Handoff Report: Identifed the Patient, Identified the Reponsible Provider, Reviewed the pertinent medical history, Discussed the surgical course, Reviewed Intra-OP anesthesia mangement and issues during anesthesia, Set expectations for post-procedure period and Allowed opportunity for questions and acknowledgement of understanding      Vitals: (Last set prior to Anesthesia Care Transfer)  CRNA VITALS  2/12/2021 1230 - 2/12/2021 1313      2/12/2021             Pulse:  69    SpO2:  97 %    Resp Rate (observed):  (!) 5        Electronically Signed By: GEE De La Rosa CRNA  February 12, 2021  1:13 PM

## 2021-02-12 NOTE — DISCHARGE INSTRUCTIONS
[facility] Same-Day Surgery   Adult Discharge Orders & Instructions     For 24 hours after surgery    1. Get plenty of rest.  A responsible adult must stay with you for at least 24 hours after you leave the hospital.   2. Do not drive or use heavy equipment.  If you have weakness or tingling, don't drive or use heavy equipment until this feeling goes away.  3. Do not drink alcohol.  4. Avoid strenuous or risky activities.  Ask for help when climbing stairs.   5. You may feel lightheaded.  If so, sit for a few minutes before standing.  Have someone help you get up.   6. You may have a slight fever. Call the doctor if your fever is over 100 F (37.7 C) (taken under the tongue) or lasts longer than 24 hours.  7. You may have a dry mouth, a sore throat, muscle aches or trouble sleeping.  These should go away after 24 hours.  8. Do not make important or legal decisions.  We don t expect you to have any problems from the surgery or treatment you had today. Just in case, here s what to do if you have pain, upset stomach (nausea), bleeding or infection:  Pain:  Take medicines your doctor has prescribed or over-the-counter medicine they have suggested. Resting and using ice packs can help, too. For surgery on an arm or leg, raise it on a pillow to ease swelling. Call your doctor if these methods don t work.  Copyright [employer], Licensed under [employer]  Upset stomach (nausea):  Take anti-nausea medicine approved by your doctor. Drink clear liquids like apple juice, ginger ale, broth or 7-Up. Be sure to drink enough fluids. Rest can help, too. Move to normal foods when you re ready. Bleeding:  In the first 24 hours, you may see a little blood on your dressing, about the size of a quarter. You don t need to worry about this much blood, but if the blood spot keeps getting bigger:    Put pressure on the wound if you can, AND    Call your doctor.  Copyright [employer], Licensed under [employer]  International  Fever/Infection: Please call your doctor if you have any of these signs:    Redness    Swelling    Wound feels warm    Pain gets worse    Bad-smelling fluid leaks from wound    Fever or chills  Call your doctor for any of the followin.  It has been over 8 to 10 hours since surgery and you are still not able to urinate (pass water).    2.  Headache for over 24 hours.       =[facility] Nurse advice line: [phone] (24 hr line)

## 2021-02-12 NOTE — ANESTHESIA POSTPROCEDURE EVALUATION
Patient: Art Reece    Procedure(s):  Exam Under Anesthesia, xray    Diagnosis:Foreign body in upper extremity, left, initial encounter [S40.374K]  Diagnosis Additional Information: No value filed.    Anesthesia Type:  IV Regional Anesthesia    Note:  Disposition: Outpatient   Postop Pain Control: Uneventful            Sign Out: Well controlled pain   PONV: No   Neuro/Psych: Uneventful            Sign Out: Acceptable/Baseline neuro status   Airway/Respiratory: Uneventful            Sign Out: Acceptable/Baseline resp. status   CV/Hemodynamics: Uneventful            Sign Out: Acceptable CV status   Other NRE: NONE   DID A NON-ROUTINE EVENT OCCUR? No         Last vitals:  Vitals:    02/12/21 1047   BP: 121/80   Resp: 16   Temp: 97.6  F (36.4  C)   SpO2: 98%       Last vitals prior to Anesthesia Care Transfer:  CRNA VITALS  2/12/2021 1230 - 2/12/2021 1314      2/12/2021             Pulse:  69    SpO2:  97 %    Resp Rate (observed):  (!) 5          Electronically Signed By: GEE De La Rosa CRNA  February 12, 2021  1:14 PM

## 2021-02-12 NOTE — OP NOTE
Procedure Date: 2021      PREOPERATIVE DIAGNOSIS:  Foreign body in left forearm.      POSTOPERATIVE DIAGNOSIS:  No foreign body present.      PROCEDURE:  X-ray under anesthesia.      HISTORY:  This is a 36-year-old male who was injured at work on 2021.  He was working on a vehicle when he struck a piece of steel lodged in a tire with a hammer.  He had a piece of the hammer break loose and penetrate his left forearm.  He was seen in the emergency room where they attempted to extract the metal, but reported they could not find it.  We had seen him in clinic and scheduled exploration under C-arm control.      SURGERY:  After smooth IV regional block, the patient's left arm was prepped and draped in a sterile fashion.  A pause was performed for patient verification.  The C-arm was then brought in to help identify the metal foreign body.  We could not locate the metal foreign body at the site of penetration or the surrounding area.  We sequentially and systematically went from the fingertips to a point about 4 inches above the elbow and could not locate the foreign body.  I then brought in plain x-ray and obtained x-ray of the forearm from the elbow through hand and on 2 views, also could not locate the foreign body.  The foreign body had been quite apparent on the initial x-ray.  At this point, I concluded that the emergency room had removed the foreign body, but had not identified that they had done so.  No post-exploration x-ray had been obtained, as they felt they had not removed the foreign body.  At this point, surgery was terminated and the patient was returned to Recovery in stable condition.      SURGEON:  Stephanie Johnson MD      ASSISTANT:  Meenakshi Atwood MD.         STEPHANIE JOHNSON MD             D: 2021   T: 2021   MT: SMOOTH      Name:     ORLANDO UMAÑA   MRN:      [id number]        Account:        [id number]   :      1985           Procedure Date: 2021      Document:  [id number]

## 2021-02-12 NOTE — ANESTHESIA PREPROCEDURE EVALUATION
Anesthesia Pre-Procedure Evaluation    Patient: Art Reece   MRN: [id number] : 1985        Preoperative Diagnosis: Foreign body in upper extremity, left, initial encounter [S49.375V]   Procedure : Procedure(s):  Left forearm foreign body removal     Past Medical History:   Diagnosis Date     Back pain       Past Surgical History:   Procedure Laterality Date     NO HISTORY OF SURGERY        No Known Allergies   Social History     Tobacco Use     Smoking status: Current Every Day Smoker     Packs/day: 1.00     Smokeless tobacco: Never Used   Substance Use Topics     Alcohol use: Yes     Alcohol/week: 20.0 standard drinks     Types: 24 Cans of beer per week     Comment: daily 3-5 beers      Wt Readings from Last 1 Encounters:   21 99.9 kg (220 lb 3.2 oz)        Anesthesia Evaluation   Pt has not had prior anesthetic         ROS/MED HX  ENT/Pulmonary:  - neg pulmonary ROS   (+) tobacco use, Current use, 1 packs/day,     Neurologic:  - neg neurologic ROS     Cardiovascular:  - neg cardiovascular ROS     METS/Exercise Tolerance:     Hematologic:  - neg hematologic  ROS     Musculoskeletal:  - neg musculoskeletal ROS     GI/Hepatic:  - neg GI/hepatic ROS     Renal/Genitourinary:  - neg Renal ROS     Endo:  - neg endo ROS     Psychiatric/Substance Use:  - neg psychiatric ROS     Infectious Disease:  - neg infectious disease ROS     Malignancy:  - neg malignancy ROS     Other:            Physical Exam    Airway  airway exam normal      Mallampati: I   TM distance: < 3 FB   Neck ROM: full     Respiratory Devices and Support         Dental  no notable dental history         Cardiovascular   cardiovascular exam normal       Rhythm and rate: regular and normal     Pulmonary   pulmonary exam normal        breath sounds clear to auscultation           OUTSIDE LABS:  CBC:   Lab Results   Component Value Date    WBC 16.5 (H) 2012    HGB 15.7 2012    HCT 43.7 2012     2012     BMP:    Lab Results   Component Value Date     08/11/2012    POTASSIUM 4.0 08/11/2012    CHLORIDE 100 08/11/2012    CO2 24 08/11/2012    BUN 13 08/11/2012    CR 1.03 08/11/2012     (H) 08/11/2012     COAGS: No results found for: PTT, INR, FIBR  POC: No results found for: BGM, HCG, HCGS  HEPATIC:   Lab Results   Component Value Date    ALBUMIN 4.9 08/11/2012    PROTTOTAL 8.1 08/11/2012    ALT 32 08/11/2012    AST 41 08/11/2012    ALKPHOS 80 08/11/2012    BILITOTAL 1.2 08/11/2012     OTHER:   Lab Results   Component Value Date    DREAD 9.2 08/11/2012    LIPASE 50 08/11/2012       Anesthesia Plan    ASA Status:  2   NPO Status:  NPO Appropriate    Anesthesia Type: IV Regional Anesthesia.   Induction: Intravenous.   Maintenance: TIVA.        Consents    Anesthesia Plan(s) and associated risks, benefits, and realistic alternatives discussed. Questions answered and patient/representative(s) expressed understanding.     - Discussed with:  Patient      - Extended Intubation/Ventilatory Support Discussed: no Extended Intubation.      - Patient is DNR/DNI Status: No    Use of blood products discussed: No .     Postoperative Care    Pain management: IV analgesics, Oral pain medications.   PONV prophylaxis: Ondansetron (or other 5HT-3)     Comments:                GEE De La Rosa CRNA

## 2021-03-10 ENCOUNTER — OFFICE VISIT (OUTPATIENT)
Dept: FAMILY MEDICINE | Facility: CLINIC | Age: 36
End: 2021-03-10
Payer: COMMERCIAL

## 2021-03-10 VITALS
HEIGHT: 73 IN | RESPIRATION RATE: 16 BRPM | WEIGHT: 217.6 LBS | OXYGEN SATURATION: 100 % | DIASTOLIC BLOOD PRESSURE: 88 MMHG | TEMPERATURE: 97.3 F | HEART RATE: 90 BPM | SYSTOLIC BLOOD PRESSURE: 130 MMHG | BODY MASS INDEX: 28.84 KG/M2

## 2021-03-10 DIAGNOSIS — E66.3 OVERWEIGHT (BMI 25.0-29.9): ICD-10-CM

## 2021-03-10 DIAGNOSIS — F10.20 UNCOMPLICATED ALCOHOL DEPENDENCE (H): ICD-10-CM

## 2021-03-10 DIAGNOSIS — F17.200 TOBACCO USE DISORDER: ICD-10-CM

## 2021-03-10 DIAGNOSIS — G89.29 CHRONIC BILATERAL LOW BACK PAIN WITHOUT SCIATICA: ICD-10-CM

## 2021-03-10 DIAGNOSIS — M54.50 CHRONIC BILATERAL LOW BACK PAIN WITHOUT SCIATICA: ICD-10-CM

## 2021-03-10 DIAGNOSIS — Z00.00 ROUTINE GENERAL MEDICAL EXAMINATION AT A HEALTH CARE FACILITY: Primary | ICD-10-CM

## 2021-03-10 LAB
ALBUMIN SERPL-MCNC: 4 G/DL (ref 3.4–5)
ALP SERPL-CCNC: 98 U/L (ref 40–150)
ALT SERPL W P-5'-P-CCNC: 56 U/L (ref 0–70)
ANION GAP SERPL CALCULATED.3IONS-SCNC: 3 MMOL/L (ref 3–14)
AST SERPL W P-5'-P-CCNC: 27 U/L (ref 0–45)
BILIRUB SERPL-MCNC: 0.3 MG/DL (ref 0.2–1.3)
BUN SERPL-MCNC: 10 MG/DL (ref 7–30)
CALCIUM SERPL-MCNC: 9.1 MG/DL (ref 8.5–10.1)
CHLORIDE SERPL-SCNC: 107 MMOL/L (ref 94–109)
CHOLEST SERPL-MCNC: 215 MG/DL
CO2 SERPL-SCNC: 30 MMOL/L (ref 20–32)
CREAT SERPL-MCNC: 0.9 MG/DL (ref 0.66–1.25)
GFR SERPL CREATININE-BSD FRML MDRD: >90 ML/MIN/{1.73_M2}
GLUCOSE SERPL-MCNC: 87 MG/DL (ref 70–99)
HDLC SERPL-MCNC: 52 MG/DL
LDLC SERPL CALC-MCNC: 131 MG/DL
NONHDLC SERPL-MCNC: 163 MG/DL
POTASSIUM SERPL-SCNC: 4 MMOL/L (ref 3.4–5.3)
PROT SERPL-MCNC: 7.5 G/DL (ref 6.8–8.8)
SODIUM SERPL-SCNC: 140 MMOL/L (ref 133–144)
TRIGL SERPL-MCNC: 160 MG/DL

## 2021-03-10 PROCEDURE — 99395 PREV VISIT EST AGE 18-39: CPT | Performed by: FAMILY MEDICINE

## 2021-03-10 PROCEDURE — 80053 COMPREHEN METABOLIC PANEL: CPT | Performed by: FAMILY MEDICINE

## 2021-03-10 PROCEDURE — 80061 LIPID PANEL: CPT | Performed by: FAMILY MEDICINE

## 2021-03-10 PROCEDURE — 36415 COLL VENOUS BLD VENIPUNCTURE: CPT | Performed by: FAMILY MEDICINE

## 2021-03-10 PROCEDURE — 86803 HEPATITIS C AB TEST: CPT | Performed by: FAMILY MEDICINE

## 2021-03-10 PROCEDURE — 99213 OFFICE O/P EST LOW 20 MIN: CPT | Mod: 25 | Performed by: FAMILY MEDICINE

## 2021-03-10 RX ORDER — CYCLOBENZAPRINE HCL 10 MG
10 TABLET ORAL AT BEDTIME
Qty: 90 TABLET | Refills: 3 | Status: SHIPPED | OUTPATIENT
Start: 2021-03-10

## 2021-03-10 ASSESSMENT — MIFFLIN-ST. JEOR: SCORE: 1970.91

## 2021-03-10 ASSESSMENT — PAIN SCALES - GENERAL: PAINLEVEL: MILD PAIN (3)

## 2021-03-10 NOTE — PROGRESS NOTES
SUBJECTIVE:   CC: Art Reece is an 36 year old male who presents for preventative health visit.         Patient has been advised of split billing requirements and indicates understanding: Yes  Healthy Habits:    Getting at least 3 servings of Calcium per day:  NO    Bi-annual eye exam:  NO    Dental care twice a year:  Yes    Sleep apnea or symptoms of sleep apnea:  Excessive snoring and Daytime drowsiness    Diet:  Regular (no restrictions)    Frequency of exercise:  2-3 days/week    Duration of exercise:  Greater than 60 minutes    Taking medications regularly:  Not Applicable    Barriers to taking medications:  Not applicable    Medication side effects:  Not applicable    PHQ-2 Total Score:    Additional concerns today:  Yes      Art is here today for his general physical with no specific concerns about the chronic back pain he has had for a while.  Constant achy pain with significant back stiffness.  It is affecting his work and daily activity when it is very bad.  Ibuprofen helped little, naproxen not effective.  Physical therapy and chiropractor did not help.  Muscle relaxant helped in the past and wanted to try it again.  The same kind of pain that he has has, localized to lower his lower back - no radiated to the leg.  No fever or chills.  No unusual activity or trauma.    Otherwise, he is doing well. No major medical care or procedure done since the last physical several years ago. No headache, dizziness, or acute visual change. No running nose, nasal congestion, coughing, fever or chill. No CP/SOB. No N/V/D/C or problem with urination.  No abdominal pain.  Denied of STD risk or erection problem.  No leg swelling.  Not exercising.  Drink about 3-5 beers a day, average 24 cans a week.  Smoke a pack a day, not ready to quit.  No drugs.  No problem with sleeping.  Feels safe at home and at work. No relationship problem, weight change or feeling depressed.  No other concern today    Today's PHQ-2 Score:    PHQ-2 ( 1999 [employer]) 2/9/2021   Q1: Little interest or pleasure in doing things 0   Q2: Feeling down, depressed or hopeless 0   PHQ-2 Score 0       Abuse: Current or Past(Physical, Sexual or Emotional)- No  Do you feel safe in your environment? Yes    Have you ever done Advance Care Planning? (For example, a Health Directive, POLST, or a discussion with a medical provider or your loved ones about your wishes): No, advance care planning information given to patient to review.  Advanced care planning was discussed at today's visit.    Social History     Tobacco Use     Smoking status: Current Every Day Smoker     Packs/day: 1.00     Smokeless tobacco: Never Used   Substance Use Topics     Alcohol use: Yes     Alcohol/week: 20.0 standard drinks     Types: 24 Cans of beer per week     Comment: daily 3-5 beers     If you drink alcohol do you typically have >3 drinks per day or >7 drinks per week? Yes      Alcohol Use 3/10/2021   AUDIT SCORE  7     AUDIT - Alcohol Use Disorders Identification Test - Reproduced from the World Health Organization Audit 2001 (Second Edition) 3/10/2021   1.  How often do you have a drink containing alcohol? 4 or more times a week   2.  How many drinks containing alcohol do you have on a typical day when you are drinking? 3 or 4   3.  How often do you have five or more drinks on one occasion? Monthly   4.  How often during the last year have you found that you were not able to stop drinking once you had started? Never   5.  How often during the last year have you failed to do what was normally expected of you because of drinking? Never   6.  How often during the last year have you needed a first drink in the morning to get yourself going after a heavy drinking session? Never   7.  How often during the last year have you had a feeling of guilt or remorse after drinking? Never   8.  How often during the last year have you been unable to remember what happened the night before because of your  drinking? Never   9.  Have you or someone else been injured because of your drinking? No   10. Has a relative, friend, doctor or other health care worker been concerned about your drinking or suggested you cut down? No   TOTAL SCORE 7       Last PSA: No results found for: PSA    Reviewed orders with patient. Reviewed health maintenance and updated orders accordingly - Yes  Patient Active Problem List   Diagnosis     Alcohol dependence (H)     Past Surgical History:   Procedure Laterality Date     EXAM UNDER ANESTHESIA, ULTRASOUND Left 2/12/2021    Procedure: Exam Under Anesthesia, xray;  Surgeon: Kanu Martinez MD;  Location: [facility] OR     NO HISTORY OF SURGERY         Social History     Tobacco Use     Smoking status: Current Every Day Smoker     Packs/day: 1.00     Smokeless tobacco: Never Used   Substance Use Topics     Alcohol use: Yes     Alcohol/week: 20.0 standard drinks     Types: 24 Cans of beer per week     Comment: daily 3-5 beers     Family History   Problem Relation Age of Onset     Arthritis Mother      Prostate Cancer Father      Unknown/Adopted Maternal Grandmother      Unknown/Adopted Maternal Grandfather      Unknown/Adopted Paternal Grandmother      Unknown/Adopted Paternal Grandfather      Coronary Artery Disease Brother         congenital heart problem     No Known Problems Sister      No Known Problems Daughter      No Known Problems Brother      No Known Problems Brother      No Known Problems Sister      No Known Problems Daughter          Current Outpatient Medications   Medication Sig Dispense Refill     cyclobenzaprine (FLEXERIL) 10 MG tablet Take 1 tablet (10 mg) by mouth At Bedtime 90 tablet 3     ibuprofen (ADVIL,MOTRIN) 200 MG tablet Take 3 tablets (600 mg) by mouth every 8 hours as needed for pain (Patient not taking: Reported on 3/10/2021) 30 tablet 0     No Known Allergies    Reviewed and updated as needed this visit by clinical staff  Tobacco  Allergies  Meds   Med Hx  Surg  "Hx  Fam Hx  Soc Hx        Reviewed and updated as needed this visit by Provider                Past Medical History:   Diagnosis Date     Back pain       Past Surgical History:   Procedure Laterality Date     EXAM UNDER ANESTHESIA, ULTRASOUND Left 2/12/2021    Procedure: Exam Under Anesthesia, xray;  Surgeon: Kanu Martinez MD;  Location: [facility]     NO HISTORY OF SURGERY         Review of Systems  Please see subjective otherwise the complete review of system was negative     OBJECTIVE:   /88   Pulse 90   Temp 97.3  F (36.3  C) (Temporal)   Resp 16   Ht 1.854 m (6' 1\")   Wt 98.7 kg (217 lb 9.6 oz)   SpO2 100%   BMI 28.71 kg/m      Physical Exam   GENERAL: healthy, alert and no distress  EYES: Eyes grossly normal to inspection, PERRL and conjunctivae and sclerae normal.  No nystagmus.  All 4 visual fields are intact  HENT: Ear canals and TM's normal.  Nares are non-congested. Oropharynx is pink and moist. No tender with palpation to the sinuses.  NECK: no adenopathy, supple, no lymphadenopathy or thyromegaly.  No tender with palpation to the cervical spine or its paraspinous muscle.  RESP: lungs clear to auscultation - no rales, rhonchi or wheezes  CV: regular rate and rhythm, no murmur.  ABDOMEN: soft, nondistended, nontender, no palpable masses or organomegaly with normal bowel sounds.  MS: no gross musculoskeletal defects noted, no edema.  Walk with no limping, normal gait.  All 4 extremities are equally in strength. Ankle, knees, hips, shoulders, elbows and wrists exams normal.  Normal fine motor skills on fingers.  Back is straight, no lordosis or scoliosis.  No tender with palpation.  SKIN: no suspicious lesions or rashes  NEURO: Normal strength and tone, mentation intact and speech normal.  Cranial nerves II through XII intact, DTR +2 throughout, no focal neurological deficit.  PSYCH: mentation appears normal, affect normal/bright.  Thoughts intact, no hallucination.  No suicidal or " homicidal ideation.  LYMPH: no cervical, supraclavicular or axillary adenopathy.   (male): Offered but he declined.  He has no concern about it.    Diagnostic Test Results:  Labs reviewed in Epic  Results for orders placed or performed in visit on 03/10/21   Lipid panel reflex to direct LDL Fasting     Status: Abnormal   Result Value Ref Range    Cholesterol 215 (H) <200 mg/dL    Triglycerides 160 (H) <150 mg/dL    HDL Cholesterol 52 >39 mg/dL    LDL Cholesterol Calculated 131 (H) <100 mg/dL    Non HDL Cholesterol 163 (H) <130 mg/dL   Comprehensive metabolic panel     Status: None   Result Value Ref Range    Sodium 140 133 - 144 mmol/L    Potassium 4.0 3.4 - 5.3 mmol/L    Chloride 107 94 - 109 mmol/L    Carbon Dioxide 30 20 - 32 mmol/L    Anion Gap 3 3 - 14 mmol/L    Glucose 87 70 - 99 mg/dL    Urea Nitrogen 10 7 - 30 mg/dL    Creatinine 0.90 0.66 - 1.25 mg/dL    GFR Estimate >90 >60 mL/min/[1.73_m2]    GFR Estimate If Black >90 >60 mL/min/[1.73_m2]    Calcium 9.1 8.5 - 10.1 mg/dL    Bilirubin Total 0.3 0.2 - 1.3 mg/dL    Albumin 4.0 3.4 - 5.0 g/dL    Protein Total 7.5 6.8 - 8.8 g/dL    Alkaline Phosphatase 98 40 - 150 U/L    ALT 56 0 - 70 U/L    AST 27 0 - 45 U/L   Hepatitis C antibody     Status: None   Result Value Ref Range    Hepatitis C Antibody Nonreactive NR^Nonreactive       ASSESSMENT/PLAN:   1. Routine general medical examination at a health care facility  Art overall is healthy and is doing well today.  Declined the Pneumovax.  Discussed about safety issue, healthy diet, exercising, weight loss, alcohol/substance misuse prevention, safe sex and STD prevention, and depression prevention.  Recommended exercising daily for at least 30 minutes.  Feels safe.  Follow in 1 year, earlier as needed.  Labs as ordered.    - Lipid panel reflex to direct LDL Fasting  - Comprehensive metabolic panel  - Hepatitis C antibody  - cyclobenzaprine (FLEXERIL) 10 MG tablet; Take 1 tablet (10 mg) by mouth At Bedtime   Dispense: 90 tablet; Refill: 3    2. Uncomplicated alcohol dependence (H)  Has been consuming about 3-4 beers a day, on average about 24 cans a week for years.  He does not feel he has any problem with it and not interested to slow down.  I discussed with him about the potential long and short term affect of excessive alcohol consumption.  Recommended to stop drinking or at least limit to no more than 1-2 drinks a day.  Instructed not to drink and drive.  No history of DUI or DWI.  He does not feel his alcohol consumption has affected his daily activity, work, or relationship.  CMP today was normal -normal liver enzyme.      3. Chronic bilateral low back pain without sciatica  Physical exam today was normal.  Encouraged to continue his normal activities as tolerated.  Stretching exercise also recommended and demonstrated.  Not interested to try physical therapy or chiropractor again as they were not effective in the past.  Continue with ibuprofen/Tylenol as needed.  Also started on the Flexeril at bedtime.  Potential side effect discussed.  Call in or follow-up as needed.    4. Tobacco use disorder  Art has been smoking for years.  Discussed with him about the long and short term consequences of tobacco smoking.  Discussed with him about different options for smoking cessation aids.  He is not ready to quit smoking at this time.  Encourage to let me know when he is ready to quit.  In a mean time, I encourage him to reduce to amount of cigarrets smoke as much as possible.  All of his questions were answered.     5. Overweight (BMI 25.0-29.9)  Today's BMI was 29.  No comorbidity identified.  Emphasized on healthy lifestyle modification as discussed above.  Discussed about the goal for his weight and his BMI.  No history of thyroid problem..      Patient has been advised of split billing requirements and indicates understanding: No  COUNSELING:   Reviewed preventive health counseling, as reflected in patient  "instructions       Regular exercise       Healthy diet/nutrition       Immunizations    Declined: Influenza and Pneumococcal due to Conscientious objector               Alcohol Use        Family planning       Safe sex practices/STD prevention       Consider Hep C screening for all patients one time for ages 18-79 years       One time pneumovax for smokers       Advance Care Planning    Estimated body mass index is 28.71 kg/m  as calculated from the following:    Height as of this encounter: 1.854 m (6' 1\").    Weight as of this encounter: 98.7 kg (217 lb 9.6 oz).     Weight management plan: Discussed healthy diet and exercise guidelines    He reports that he has been smoking. He has been smoking about 1.00 pack per day. He has never used smokeless tobacco.  Tobacco Cessation Action Plan:   Information offered: Patient not interested at this time      Counseling Resources:  ATP IV Guidelines  Pooled Cohorts Equation Calculator  FRAX Risk Assessment  ICSI Preventive Guidelines  Dietary Guidelines for Americans, 2010  USDA's MyPlate  ASA Prophylaxis  Lung CA Screening    Camila Wolfe Mai, MD  [facility]  "

## 2021-03-11 LAB — HCV AB SERPL QL IA: NONREACTIVE

## 2021-03-25 PROBLEM — F17.200 TOBACCO USE DISORDER: Status: ACTIVE | Noted: 2021-03-25

## 2021-03-25 PROBLEM — S40.852A: Status: RESOLVED | Noted: 2021-01-29 | Resolved: 2021-03-25

## 2021-03-25 PROBLEM — S50.852A FOREIGN BODY IN LEFT FOREARM: Status: RESOLVED | Noted: 2021-01-28 | Resolved: 2021-03-25

## 2021-03-25 PROBLEM — E66.3 OVERWEIGHT (BMI 25.0-29.9): Status: ACTIVE | Noted: 2021-03-25

## 2021-08-02 ENCOUNTER — HOSPITAL ENCOUNTER (EMERGENCY)
Facility: CLINIC | Age: 36
Discharge: HOME | End: 2021-08-02
Attending: NURSE PRACTITIONER | Admitting: NURSE PRACTITIONER
Payer: COMMERCIAL

## 2021-08-02 VITALS
WEIGHT: 220 LBS | DIASTOLIC BLOOD PRESSURE: 93 MMHG | RESPIRATION RATE: 20 BRPM | TEMPERATURE: 97.8 F | SYSTOLIC BLOOD PRESSURE: 143 MMHG | HEART RATE: 100 BPM | HEIGHT: 73 IN | BODY MASS INDEX: 29.16 KG/M2 | OXYGEN SATURATION: 98 %

## 2021-08-02 DIAGNOSIS — R40.0 DROWSINESS: ICD-10-CM

## 2021-08-02 LAB
ALBUMIN SERPL-MCNC: 4.1 G/DL (ref 3.4–5)
ALP SERPL-CCNC: 102 U/L (ref 40–150)
ALT SERPL W P-5'-P-CCNC: 48 U/L (ref 0–70)
ANION GAP SERPL CALCULATED.3IONS-SCNC: 4 MMOL/L (ref 3–14)
AST SERPL W P-5'-P-CCNC: 30 U/L (ref 0–45)
BASOPHILS # BLD AUTO: 0 10E3/UL (ref 0–0.2)
BASOPHILS NFR BLD AUTO: 0 %
BILIRUB SERPL-MCNC: 0.4 MG/DL (ref 0.2–1.3)
BUN SERPL-MCNC: 11 MG/DL (ref 7–30)
CALCIUM SERPL-MCNC: 9 MG/DL (ref 8.5–10.1)
CHLORIDE BLD-SCNC: 108 MMOL/L (ref 94–109)
CO2 SERPL-SCNC: 31 MMOL/L (ref 20–32)
CREAT SERPL-MCNC: 1.01 MG/DL (ref 0.66–1.25)
EOSINOPHIL # BLD AUTO: 0.4 10E3/UL (ref 0–0.7)
EOSINOPHIL NFR BLD AUTO: 4 %
ERYTHROCYTE [DISTWIDTH] IN BLOOD BY AUTOMATED COUNT: 12.2 % (ref 10–15)
GFR SERPL CREATININE-BSD FRML MDRD: >90 ML/MIN/1.73M2
GLUCOSE BLD-MCNC: 126 MG/DL (ref 70–99)
HCT VFR BLD AUTO: 46 % (ref 40–53)
HGB BLD-MCNC: 16.4 G/DL (ref 13.3–17.7)
IMM GRANULOCYTES # BLD: 0 10E3/UL
IMM GRANULOCYTES NFR BLD: 0 %
LYMPHOCYTES # BLD AUTO: 2.8 10E3/UL (ref 0.8–5.3)
LYMPHOCYTES NFR BLD AUTO: 29 %
MCH RBC QN AUTO: 32.5 PG (ref 26.5–33)
MCHC RBC AUTO-ENTMCNC: 35.7 G/DL (ref 31.5–36.5)
MCV RBC AUTO: 91 FL (ref 78–100)
MONOCYTES # BLD AUTO: 0.8 10E3/UL (ref 0–1.3)
MONOCYTES NFR BLD AUTO: 8 %
NEUTROPHILS # BLD AUTO: 5.9 10E3/UL (ref 1.6–8.3)
NEUTROPHILS NFR BLD AUTO: 59 %
NRBC # BLD AUTO: 0 10E3/UL
NRBC BLD AUTO-RTO: 0 /100
PLATELET # BLD AUTO: 233 10E3/UL (ref 150–450)
POTASSIUM BLD-SCNC: 3.6 MMOL/L (ref 3.4–5.3)
PROT SERPL-MCNC: 7.6 G/DL (ref 6.8–8.8)
RBC # BLD AUTO: 5.04 10E6/UL (ref 4.4–5.9)
SODIUM SERPL-SCNC: 143 MMOL/L (ref 133–144)
WBC # BLD AUTO: 9.9 10E3/UL (ref 4–11)

## 2021-08-02 PROCEDURE — 99282 EMERGENCY DEPT VISIT SF MDM: CPT | Performed by: NURSE PRACTITIONER

## 2021-08-02 PROCEDURE — 99283 EMERGENCY DEPT VISIT LOW MDM: CPT | Performed by: NURSE PRACTITIONER

## 2021-08-02 PROCEDURE — 36415 COLL VENOUS BLD VENIPUNCTURE: CPT | Performed by: NURSE PRACTITIONER

## 2021-08-02 PROCEDURE — 85025 COMPLETE CBC W/AUTO DIFF WBC: CPT | Performed by: NURSE PRACTITIONER

## 2021-08-02 PROCEDURE — 80053 COMPREHEN METABOLIC PANEL: CPT | Performed by: NURSE PRACTITIONER

## 2021-08-02 ASSESSMENT — MIFFLIN-ST. JEOR: SCORE: 1981.79

## 2021-08-03 NOTE — DISCHARGE INSTRUCTIONS
Your glucose is mildly elevated at 126. The remainder of your labs are normal.  I recommend recheck in clinic in 1 week with recheck of your blood sugar and symptoms.

## 2021-08-03 NOTE — ED PROVIDER NOTES
"  History     Chief Complaint   Patient presents with     Fatigue     The history is provided by the patient.     Art Reece is a 36 year old male who presents to the emergency department for evaluation of recent fatigue. The patient reports that last night around 1900 he started to feel extremely tired, his eyes got heavy, and he was unable to focus. He describes it as being similar to when you start to nod off while driving a car, then snap back to reality and wake up and feel \"fuzzy\". He was at work today and continued to feel drowsy so he had to leave early. Denies fevers, chills, congestion, nausea, vomiting, diarrhea, constipation, black or bloody stools, dizziness, urinary symptoms. Chronic cough from smoking. 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 No recent insomnia. The patient drinks alcohol occasionally - he had 3 beers yesterday. No drug use. No sick contacts at home. No history of seasonal allergies. He uses ibuprofen and muscle relaxer's for back pain but notes he has not taken these for a few days.     Allergies:  No Known Allergies    Problem List:    Patient Active Problem List    Diagnosis Date Noted     Tobacco use disorder 03/25/2021     Priority: Medium     Overweight (BMI 25.0-29.9) 03/25/2021     Priority: Medium     Alcohol dependence (H) 02/09/2021     Priority: Medium        Past Medical History:    Past Medical History:   Diagnosis Date     Back pain        Past Surgical History:    Past Surgical History:   Procedure Laterality Date     EXAM UNDER ANESTHESIA, ULTRASOUND Left 2/12/2021    Procedure: Exam Under Anesthesia, xray;  Surgeon: Kanu Martinez MD;  Location:  OR     NO HISTORY OF SURGERY         Family History:    Family History   Problem Relation Age of Onset     Arthritis Mother      Prostate Cancer Father      Unknown/Adopted Maternal Grandmother      Unknown/Adopted Maternal Grandfather      " "Unknown/Adopted Paternal Grandmother      Unknown/Adopted Paternal Grandfather      Coronary Artery Disease Brother         congenital heart problem     No Known Problems Sister      No Known Problems Daughter      No Known Problems Brother      No Known Problems Brother      No Known Problems Sister      No Known Problems Daughter        Social History:  Marital Status:   [2]  Social History     Tobacco Use     Smoking status: Current Every Day Smoker     Packs/day: 1.00     Smokeless tobacco: Never Used   Substance Use Topics     Alcohol use: Yes     Alcohol/week: 20.0 standard drinks     Types: 24 Cans of beer per week     Comment: daily 3-5 beers     Drug use: No        Medications:    cyclobenzaprine (FLEXERIL) 10 MG tablet  tiZANidine (ZANAFLEX) 4 MG capsule          Review of Systems   All other systems reviewed and are negative.      Physical Exam   BP: (!) 143/93  Pulse: 100  Temp: 97.8  F (36.6  C)  Resp: 20  Height: 185.4 cm (6' 1\")  Weight: 99.8 kg (220 lb)  SpO2: 98 %      Physical Exam  Vitals and nursing note reviewed.   Constitutional:       General: He is not in acute distress.     Appearance: Normal appearance. He is not ill-appearing or diaphoretic.   HENT:      Head: Normocephalic and atraumatic.      Right Ear: Tympanic membrane, ear canal and external ear normal.      Left Ear: Tympanic membrane, ear canal and external ear normal.      Mouth/Throat:      Mouth: Mucous membranes are moist.      Pharynx: Oropharynx is clear. No oropharyngeal exudate or posterior oropharyngeal erythema.   Eyes:      General: No scleral icterus.     Extraocular Movements: Extraocular movements intact.      Conjunctiva/sclera: Conjunctivae normal.      Pupils: Pupils are equal, round, and reactive to light.   Cardiovascular:      Rate and Rhythm: Normal rate and regular rhythm.      Heart sounds: Normal heart sounds. No murmur heard.     Pulmonary:      Effort: Pulmonary effort is normal. No respiratory " distress.      Breath sounds: Wheezing (expiratory - throughout the lungs) present.   Musculoskeletal:         General: No deformity or signs of injury. Normal range of motion.   Skin:     General: Skin is warm and dry.      Coloration: Skin is not pale.      Findings: No rash.   Neurological:      General: No focal deficit present.      Mental Status: He is alert and oriented to person, place, and time. Mental status is at baseline.         ED Course        Procedures      Results for orders placed or performed during the hospital encounter of 08/02/21 (from the past 24 hour(s))   CBC with platelets differential    Narrative    The following orders were created for panel order CBC with platelets differential.  Procedure                               Abnormality         Status                     ---------                               -----------         ------                     CBC with platelets and d...[[id number]]                      Final result                 Please view results for these tests on the individual orders.   Comprehensive metabolic panel   Result Value Ref Range    Sodium 143 133 - 144 mmol/L    Potassium 3.6 3.4 - 5.3 mmol/L    Chloride 108 94 - 109 mmol/L    Carbon Dioxide (CO2) 31 20 - 32 mmol/L    Anion Gap 4 3 - 14 mmol/L    Urea Nitrogen 11 7 - 30 mg/dL    Creatinine 1.01 0.66 - 1.25 mg/dL    Calcium 9.0 8.5 - 10.1 mg/dL    Glucose 126 (H) 70 - 99 mg/dL    Alkaline Phosphatase 102 40 - 150 U/L    AST 30 0 - 45 U/L    ALT 48 0 - 70 U/L    Protein Total 7.6 6.8 - 8.8 g/dL    Albumin 4.1 3.4 - 5.0 g/dL    Bilirubin Total 0.4 0.2 - 1.3 mg/dL    GFR Estimate >90 >60 mL/min/1.73m2   CBC with platelets and differential   Result Value Ref Range    WBC Count 9.9 4.0 - 11.0 10e3/uL    RBC Count 5.04 4.40 - 5.90 10e6/uL    Hemoglobin 16.4 13.3 - 17.7 g/dL    Hematocrit 46.0 40.0 - 53.0 %    MCV 91 78 - 100 fL    MCH 32.5 26.5 - 33.0 pg    MCHC 35.7 31.5 - 36.5 g/dL    RDW 12.2 10.0 - 15.0 %     Platelet Count 233 150 - 450 10e3/uL    % Neutrophils 59 %    % Lymphocytes 29 %    % Monocytes 8 %    % Eosinophils 4 %    % Basophils 0 %    % Immature Granulocytes 0 %    NRBCs per 100 WBC 0 <1 /100    Absolute Neutrophils 5.9 1.6 - 8.3 10e3/uL    Absolute Lymphocytes 2.8 0.8 - 5.3 10e3/uL    Absolute Monocytes 0.8 0.0 - 1.3 10e3/uL    Absolute Eosinophils 0.4 0.0 - 0.7 10e3/uL    Absolute Basophils 0.0 0.0 - 0.2 10e3/uL    Absolute Immature Granulocytes 0.0 <=0.0 10e3/uL    Absolute NRBCs 0.0 10e3/uL       Medications - No data to display    Assessments & Plan (with Medical Decision Making)   Unclear etiology for his feeling of drowsiness and fatigue the last 24 hours.  This certainly could be due to his long work hours outside in the heat.  This could also be a combination of factors with having alcohol beverage in the evening.  We discussed the possibility that he is starting to come down with a viral illness and if should he develop congestion or cough that would be consistent with a viral illness that was starting.  No worrisome exam findings.  He is afebrile.  BP is mildly elevated.  Labs reveal no significant concerns with exception of glucose of 126.  This is mildly elevated, but given his feeling of fatigue I do recommend a follow-up in clinic next week.  He should have recheck of his blood sugar.  He may need a fasting blood sugar and may need further work-up if his symptoms persist or are accompanied by any other concerning symptoms such as headaches, muscle weakness, or neurologic symptoms.        I have reviewed the nursing notes.    I have reviewed the findings, diagnosis, plan and need for follow up with the patient.      Discharge Medication List as of 8/2/2021 10:39 PM          Final diagnoses:   Drowsiness       This document serves as a record of services personally performed by Lillie Guy, CNP*. It was created on their behalf by Tracy Valentin, a trained medical scribe. The  creation of this record is based on the provider's personal observations and the statements of the patient. This document has been checked and approved by the attending provider.    Note: Chart documentation done in part with Dragon Voice Recognition software. Although reviewed after completion, some word and grammatical errors may remain.    8/2/2021   [facility] EMERGENCY DEPT     Lillie Guy APRN CNP  08/02/21 0146

## 2021-08-09 ENCOUNTER — OFFICE VISIT (OUTPATIENT)
Dept: FAMILY MEDICINE | Facility: CLINIC | Age: 36
End: 2021-08-09
Payer: COMMERCIAL

## 2021-08-09 VITALS
DIASTOLIC BLOOD PRESSURE: 82 MMHG | WEIGHT: 208 LBS | RESPIRATION RATE: 18 BRPM | HEIGHT: 72 IN | BODY MASS INDEX: 28.17 KG/M2 | HEART RATE: 70 BPM | OXYGEN SATURATION: 99 % | SYSTOLIC BLOOD PRESSURE: 122 MMHG | TEMPERATURE: 98 F

## 2021-08-09 DIAGNOSIS — R53.83 FATIGUE, UNSPECIFIED TYPE: Primary | ICD-10-CM

## 2021-08-09 DIAGNOSIS — R63.4 WEIGHT LOSS: ICD-10-CM

## 2021-08-09 LAB
FASTING STATUS PATIENT QL REPORTED: YES
GLUCOSE BLD-MCNC: 91 MG/DL (ref 70–99)
HBA1C MFR BLD: 5.3 % (ref 0–5.6)
TSH SERPL DL<=0.005 MIU/L-ACNC: 0.94 MU/L (ref 0.4–4)

## 2021-08-09 PROCEDURE — 36415 COLL VENOUS BLD VENIPUNCTURE: CPT | Performed by: FAMILY MEDICINE

## 2021-08-09 PROCEDURE — 84443 ASSAY THYROID STIM HORMONE: CPT | Performed by: FAMILY MEDICINE

## 2021-08-09 PROCEDURE — 83036 HEMOGLOBIN GLYCOSYLATED A1C: CPT | Performed by: FAMILY MEDICINE

## 2021-08-09 PROCEDURE — 99214 OFFICE O/P EST MOD 30 MIN: CPT | Performed by: FAMILY MEDICINE

## 2021-08-09 PROCEDURE — 82947 ASSAY GLUCOSE BLOOD QUANT: CPT | Performed by: FAMILY MEDICINE

## 2021-08-09 ASSESSMENT — MIFFLIN-ST. JEOR: SCORE: 1913.07

## 2021-08-09 ASSESSMENT — PAIN SCALES - GENERAL: PAINLEVEL: NO PAIN (0)

## 2021-08-09 NOTE — PROGRESS NOTES
Assessment & Plan     Fatigue, unspecified type  Abrupt onset with no known trigger.  Overall feeling better slowly with lifestyle modification.  I reviewed the medical record from the ER.  Blood sugar was slightly elevated, but doubtful that he has diabetes.  He is quite concerned it and therefore will screen for it with a fasting glucose and hemoglobin A1c.  I also check his thyroid level today.  They were indeed normal and he was reassured.  Physical exam today was also normal.  Most likely has a viral syndrome and would recommend to monitor closely at this point.  Encouraged to drink a lot of water and continue his normal activity as tolerated.  Also emphasized the importance of adequate resting.  Encouraged him to keep up the good work with not drinking soda excessively.  Limited alcohol no more than couple beers a day; stop completely if possible.  If the symptoms persist or get worse, will consider further evaluation.  Doubtful that he has mono.      - TSH with free T4 reflex; Future  - Hemoglobin A1c; Future  - Glucose; Future  - TSH with free T4 reflex  - Hemoglobin A1c  - Glucose    Weight loss  Chart reviewed and noted he lost 12 pounds in the last week.  His weight has been stable around 220 pound.  He weighed 208 pounds today.  His TSH level was normal.  He has been having low appetite in the last week.  He feels his appetite is getting better however.  I recommend to continue monitoring as well.  Emphasized importance follow-up if keeps losing weight.  He felt comfortable with the plan.    - TSH with free T4 reflex; Future  - Hemoglobin A1c; Future  - Glucose; Future  - TSH with free T4 reflex  - Hemoglobin A1c  - Glucose      Return in about 9 months (around 5/9/2022) for Physical Exam.    Camila Wolfe Mai, MD  [facility] MAVERICK Cordova is a 36 year old who presents for the following health issues     HPI     ED/UC Followup:    Facility:  [facility]  Date of visit:  "08/02/21  Reason for visit: Fatigue  Current Status: Patient would like to have fasting labs completed, Patient reports this has improved in the last few days, patient states he has not had an appetite, and feels sick when he does eat.           Art is here today for ER follow-up.  He was in the ER a week ago for extremely fatigue with feeling drowsy.  Abrupt onset at around 8 PM while he was resting, with no known trigger.  Stated that he suddenly feel extremely tired with heavy feeling on his eyes and started having trouble with focusing.  No associated blurred vision or double vision.  It happened once with similar symptoms when he was driving but then snapped back to reality which follows by fuzzy feeling.  Work-up in the ER was unremarkable except that the blood glucose was 126 nonfasting.  He was told by to follow-up for further evaluation for possible diabetes.  CBC was normal.    Stated that he overall is feeling better since discharge discharge from the ER.  He change his diet - eating healthier food and is no longer drinking pop.  Denied of excessive alcohol intake, drink 2-3 beers couple times a week.  His appetite remains low, but tolerating oral intake well.  States his mouth feels dry, and therefore has been drinking a lot of water.  His mother has diabetes.  No family history of heart disease.  He denied of being depressed.  No excessive stress in his life.  No history of thyroid problem.  No other concern.    Review of Systems   Constitutional, HEENT, cardiovascular, pulmonary, gi and gu systems are negative, except as otherwise noted.      Objective    /82   Pulse 70   Temp 98  F (36.7  C) (Temporal)   Resp 18   Ht 1.831 m (6' 0.1\")   Wt 94.3 kg (208 lb)   SpO2 99%   BMI 28.13 kg/m    Body mass index is 28.13 kg/m .  Physical Exam   GENERAL: healthy, alert and no distress.    EYES: Eyes grossly normal to inspection, PERRL and conjunctivae and sclerae normal.  No nystagmus.  All 4 " visual fields are intact.  HENT: ear canals and TM's normal, nose and mouth without ulcers or lesions. Nares are non-congested. Oropharynx is pink and moist. No tender with palpation to the sinuses.   NECK: Supple, no lymphadenopathy or thyromegaly  RESP: lungs clear to auscultation - no rales, rhonchi or wheezes  CV: regular rate and rhythm, normal S1 S2, no S3 or S4, no murmur, click or rub  ABDOMEN: soft, nontender, nondistended no hepatosplenomegaly, no masses and bowel sounds normal  MS: no gross musculoskeletal defects noted, no edema.  No focal weakness.  NEURO: Normal strength and tone, mentation intact and speech normal.  Cranial nerves II through XII intact.  DTR +2 throughout.  PSYCH: mentation appears normal, affect normal/bright.    Results for orders placed or performed in visit on 08/09/21   TSH with free T4 reflex     Status: Normal   Result Value Ref Range    TSH 0.94 0.40 - 4.00 mU/L   Hemoglobin A1c     Status: Normal   Result Value Ref Range    Hemoglobin A1C 5.3 0.0 - 5.6 %   Glucose     Status: None   Result Value Ref Range    Glucose 91 70 - 99 mg/dL    Patient Fasting > 8hrs? Yes

## 2021-09-26 ENCOUNTER — HEALTH MAINTENANCE LETTER (OUTPATIENT)
Age: 36
End: 2021-09-26

## 2021-10-09 ENCOUNTER — MYC MEDICAL ADVICE (OUTPATIENT)
Dept: FAMILY MEDICINE | Facility: CLINIC | Age: 36
End: 2021-10-09

## 2022-04-15 ENCOUNTER — HOSPITAL ENCOUNTER (EMERGENCY)
Facility: CLINIC | Age: 37
Discharge: HOME | End: 2022-04-15
Attending: PHYSICIAN ASSISTANT | Admitting: PHYSICIAN ASSISTANT
Payer: COMMERCIAL

## 2022-04-15 VITALS
DIASTOLIC BLOOD PRESSURE: 85 MMHG | SYSTOLIC BLOOD PRESSURE: 136 MMHG | TEMPERATURE: 98 F | RESPIRATION RATE: 22 BRPM | HEART RATE: 84 BPM | OXYGEN SATURATION: 96 %

## 2022-04-15 DIAGNOSIS — F41.1 ANXIETY REACTION: ICD-10-CM

## 2022-04-15 DIAGNOSIS — R06.00 DYSPNEA: ICD-10-CM

## 2022-04-15 LAB
ANION GAP SERPL CALCULATED.3IONS-SCNC: 5 MMOL/L (ref 3–14)
BASOPHILS # BLD AUTO: 0 10E3/UL (ref 0–0.2)
BASOPHILS NFR BLD AUTO: 0 %
BUN SERPL-MCNC: 9 MG/DL (ref 7–30)
CALCIUM SERPL-MCNC: 8.8 MG/DL (ref 8.5–10.1)
CHLORIDE BLD-SCNC: 108 MMOL/L (ref 94–109)
CO2 SERPL-SCNC: 26 MMOL/L (ref 20–32)
CREAT SERPL-MCNC: 0.87 MG/DL (ref 0.66–1.25)
EOSINOPHIL # BLD AUTO: 0.1 10E3/UL (ref 0–0.7)
EOSINOPHIL NFR BLD AUTO: 1 %
ERYTHROCYTE [DISTWIDTH] IN BLOOD BY AUTOMATED COUNT: 12.2 % (ref 10–15)
GFR SERPL CREATININE-BSD FRML MDRD: >90 ML/MIN/1.73M2
GLUCOSE BLD-MCNC: 127 MG/DL (ref 70–99)
HCT VFR BLD AUTO: 44.9 % (ref 40–53)
HGB BLD-MCNC: 15.5 G/DL (ref 13.3–17.7)
IMM GRANULOCYTES # BLD: 0 10E3/UL
IMM GRANULOCYTES NFR BLD: 0 %
LYMPHOCYTES # BLD AUTO: 2.6 10E3/UL (ref 0.8–5.3)
LYMPHOCYTES NFR BLD AUTO: 22 %
MCH RBC QN AUTO: 32 PG (ref 26.5–33)
MCHC RBC AUTO-ENTMCNC: 34.5 G/DL (ref 31.5–36.5)
MCV RBC AUTO: 93 FL (ref 78–100)
MONOCYTES # BLD AUTO: 0.8 10E3/UL (ref 0–1.3)
MONOCYTES NFR BLD AUTO: 7 %
NEUTROPHILS # BLD AUTO: 8.2 10E3/UL (ref 1.6–8.3)
NEUTROPHILS NFR BLD AUTO: 70 %
NRBC # BLD AUTO: 0 10E3/UL
NRBC BLD AUTO-RTO: 0 /100
NT-PROBNP SERPL-MCNC: 51 PG/ML (ref 0–450)
PLATELET # BLD AUTO: 247 10E3/UL (ref 150–450)
POTASSIUM BLD-SCNC: 4 MMOL/L (ref 3.4–5.3)
RBC # BLD AUTO: 4.84 10E6/UL (ref 4.4–5.9)
SODIUM SERPL-SCNC: 139 MMOL/L (ref 133–144)
TROPONIN I SERPL HS-MCNC: <3 NG/L
TROPONIN I SERPL HS-MCNC: <3 NG/L
WBC # BLD AUTO: 11.8 10E3/UL (ref 4–11)

## 2022-04-15 PROCEDURE — 250N000011 HC RX IP 250 OP 636: Performed by: PHYSICIAN ASSISTANT

## 2022-04-15 PROCEDURE — 83880 ASSAY OF NATRIURETIC PEPTIDE: CPT | Performed by: PHYSICIAN ASSISTANT

## 2022-04-15 PROCEDURE — 84484 ASSAY OF TROPONIN QUANT: CPT | Performed by: PHYSICIAN ASSISTANT

## 2022-04-15 PROCEDURE — 80048 BASIC METABOLIC PNL TOTAL CA: CPT | Performed by: PHYSICIAN ASSISTANT

## 2022-04-15 PROCEDURE — 84484 ASSAY OF TROPONIN QUANT: CPT | Mod: 91 | Performed by: PHYSICIAN ASSISTANT

## 2022-04-15 PROCEDURE — 85025 COMPLETE CBC W/AUTO DIFF WBC: CPT | Performed by: PHYSICIAN ASSISTANT

## 2022-04-15 PROCEDURE — 93005 ELECTROCARDIOGRAM TRACING: CPT | Performed by: PHYSICIAN ASSISTANT

## 2022-04-15 PROCEDURE — 99285 EMERGENCY DEPT VISIT HI MDM: CPT | Mod: 25 | Performed by: PHYSICIAN ASSISTANT

## 2022-04-15 PROCEDURE — 93010 ELECTROCARDIOGRAM REPORT: CPT | Performed by: PHYSICIAN ASSISTANT

## 2022-04-15 PROCEDURE — 36415 COLL VENOUS BLD VENIPUNCTURE: CPT | Performed by: PHYSICIAN ASSISTANT

## 2022-04-15 PROCEDURE — 96374 THER/PROPH/DIAG INJ IV PUSH: CPT | Performed by: PHYSICIAN ASSISTANT

## 2022-04-15 RX ORDER — LORAZEPAM 2 MG/ML
0.5 INJECTION INTRAMUSCULAR ONCE
Status: COMPLETED | OUTPATIENT
Start: 2022-04-15 | End: 2022-04-15

## 2022-04-15 RX ADMIN — LORAZEPAM 0.5 MG: 2 INJECTION INTRAMUSCULAR; INTRAVENOUS at 14:13

## 2022-04-15 NOTE — ED TRIAGE NOTES
"\"I can't breathe\", \"I have to think hard to try and get enough air and to breathe\". Felt okay this morning. Weakness, anxious. Denies chest pain. \"I am just trying to stay awake\".   "

## 2022-04-15 NOTE — ED PROVIDER NOTES
"  History     Chief Complaint   Patient presents with     Shortness of Breath       HPI  Art Reece is a 37 year old male who presents to the emergency department complaining of shortness of breath. The patient reports 1 hour prior to arrival he was sitting watching TV, about ready to leave to go put his dog down when he developed acute shortness of breath.  He states \"I feel like I am dying, I cannot breathe.\"  He denies associated chest pain, recent cough or fevers.  He did feel nauseated this morning and had an episode of diarrhea but no vomiting or abdominal pain.  He has not taken anything for pain.  He denies any alcohol use today and denies any drug use.  He is a smoker.  Apparently the appointment to put his dog down got canceled since he came here.  He states that she has been having seizures.        Allergies:  No Known Allergies    Problem List:    Patient Active Problem List    Diagnosis Date Noted     Tobacco use disorder 03/25/2021     Priority: Medium     Overweight (BMI 25.0-29.9) 03/25/2021     Priority: Medium     Alcohol dependence (H) 02/09/2021     Priority: Medium        Past Medical History:    Past Medical History:   Diagnosis Date     Back pain        Past Surgical History:    Past Surgical History:   Procedure Laterality Date     EXAM UNDER ANESTHESIA, ULTRASOUND Left 2/12/2021    Procedure: Exam Under Anesthesia, xray;  Surgeon: Kanu Martinez MD;  Location:  OR     NO HISTORY OF SURGERY         Family History:    Family History   Problem Relation Age of Onset     Arthritis Mother      Prostate Cancer Father      Unknown/Adopted Maternal Grandmother      Unknown/Adopted Maternal Grandfather      Unknown/Adopted Paternal Grandmother      Unknown/Adopted Paternal Grandfather      Coronary Artery Disease Brother         congenital heart problem     No Known Problems Sister      No Known Problems Daughter      No Known Problems Brother      No Known Problems Brother      No " Known Problems Sister      No Known Problems Daughter        Social History:  Marital Status:   [2]  Social History     Tobacco Use     Smoking status: Current Every Day Smoker     Packs/day: 1.00     Years: 10.00     Pack years: 10.00     Types: Cigarettes     Smokeless tobacco: Never Used   Vaping Use     Vaping Use: Never used   Substance Use Topics     Alcohol use: Yes     Alcohol/week: 20.0 standard drinks     Comment: daily 3-5 beers     Drug use: No        Medications:    cyclobenzaprine (FLEXERIL) 10 MG tablet          Review of Systems   All other systems reviewed and are negative.      Physical Exam   BP: (!) 158/96  Pulse: 98  Temp: 98  F (36.7  C)  Resp: 22  SpO2: 99 %      Physical Exam  Vitals and nursing note reviewed.   Constitutional:       Appearance: He is well-developed. He is not ill-appearing, toxic-appearing or diaphoretic.      Comments: Breathing shallow, talking in whispers, seems close to tears.   HENT:      Head: Normocephalic and atraumatic.   Eyes:      Extraocular Movements: Extraocular movements intact.      Pupils: Pupils are equal, round, and reactive to light.   Cardiovascular:      Rate and Rhythm: Normal rate and regular rhythm.      Heart sounds: Normal heart sounds.   Pulmonary:      Effort: Pulmonary effort is normal. No tachypnea or accessory muscle usage.      Breath sounds: Normal breath sounds. No decreased breath sounds, wheezing, rhonchi or rales.   Abdominal:      General: Abdomen is flat. There is no distension.      Tenderness: There is no abdominal tenderness.   Musculoskeletal:         General: No deformity.      Cervical back: Neck supple.   Skin:     General: Skin is warm and dry.   Neurological:      General: No focal deficit present.      Mental Status: He is alert and oriented to person, place, and time. Mental status is at baseline.   Psychiatric:         Mood and Affect: Mood is anxious and depressed. Affect is flat and tearful.         Behavior:  Behavior normal.         ED Course        Procedures           EKG Interpretation:      Interpreted by Jane Peralta PA-C  Time reviewed: 1400  Symptoms at time of EKG: shortness of breath   Rhythm: normal sinus   Rate: normal  Axis: normal  Ectopy: none  Conduction: normal  ST Segments/ T Waves: No ST-T wave changes  Q Waves: none  Comparison to prior: No old EKG available    Clinical Impression: normal EKG      Results for orders placed or performed during the hospital encounter of 04/15/22 (from the past 24 hour(s))   CBC with platelets differential    Narrative    The following orders were created for panel order CBC with platelets differential.  Procedure                               Abnormality         Status                     ---------                               -----------         ------                     CBC with platelets and d...[[id number]]  Abnormal            Final result                 Please view results for these tests on the individual orders.   Basic metabolic panel   Result Value Ref Range    Sodium 139 133 - 144 mmol/L    Potassium 4.0 3.4 - 5.3 mmol/L    Chloride 108 94 - 109 mmol/L    Carbon Dioxide (CO2) 26 20 - 32 mmol/L    Anion Gap 5 3 - 14 mmol/L    Urea Nitrogen 9 7 - 30 mg/dL    Creatinine 0.87 0.66 - 1.25 mg/dL    Calcium 8.8 8.5 - 10.1 mg/dL    Glucose 127 (H) 70 - 99 mg/dL    GFR Estimate >90 >60 mL/min/1.73m2   Troponin I   Result Value Ref Range    Troponin I High Sensitivity <3 <79 ng/L   Nt probnp inpatient (BNP)   Result Value Ref Range    N terminal Pro BNP Inpatient 51 0 - 450 pg/mL   CBC with platelets and differential   Result Value Ref Range    WBC Count 11.8 (H) 4.0 - 11.0 10e3/uL    RBC Count 4.84 4.40 - 5.90 10e6/uL    Hemoglobin 15.5 13.3 - 17.7 g/dL    Hematocrit 44.9 40.0 - 53.0 %    MCV 93 78 - 100 fL    MCH 32.0 26.5 - 33.0 pg    MCHC 34.5 31.5 - 36.5 g/dL    RDW 12.2 10.0 - 15.0 %    Platelet Count 247 150 - 450 10e3/uL    % Neutrophils 70 %    %  Lymphocytes 22 %    % Monocytes 7 %    % Eosinophils 1 %    % Basophils 0 %    % Immature Granulocytes 0 %    NRBCs per 100 WBC 0 <1 /100    Absolute Neutrophils 8.2 1.6 - 8.3 10e3/uL    Absolute Lymphocytes 2.6 0.8 - 5.3 10e3/uL    Absolute Monocytes 0.8 0.0 - 1.3 10e3/uL    Absolute Eosinophils 0.1 0.0 - 0.7 10e3/uL    Absolute Basophils 0.0 0.0 - 0.2 10e3/uL    Absolute Immature Granulocytes 0.0 <=0.4 10e3/uL    Absolute NRBCs 0.0 10e3/uL   Troponin I   Result Value Ref Range    Troponin I High Sensitivity <3 <79 ng/L       Medications   LORazepam (ATIVAN) injection 0.5 mg (0.5 mg Intravenous Given 4/15/22 1413)          Assessments & Plan (with Medical Decision Making)  Art Reece is a 37 year old male who presented to the ED complaining of acute shortness of breath that started 1 hour prior to arrival.  Denies any associated symptoms of chest pain but did have some nausea earlier this morning.  No recent illnesses to include no cough or fever.  On arrival to the ED he was afebrile with elevated blood pressure but otherwise normal vital signs.  O2 saturations 99% on room air.  He appeared depressed and anxious and occasionally tearful.  No acute abnormalities on exam, lung sounds with good air movement throughout with no wheeze.  Given his history, I do suspect this is more of an anxiety versus grief reaction.  He was just about to be on his way to put down his beloved dog when he developed the shortness of breath.  Certainly could have cardiac component such as ACS or even Takotsubo however.  Without pleuritic pain, normal O2, and normal heart rate, low concern for PE at this time.  Patient was given 0.5 mg of Ativan which actually resolved his symptoms.  His EKG today was normal.  Initial troponin and BNP were also normal.  I discussed these results with the patient and he was overall reassured.  We did repeat the troponin 2 hours after the initial and this was fortunately negative.  No clear signs of  cardiac involvement at this time.  Clinically suspect an anxiety reaction from grief.  Discussed this probability with the patient; he did remain asymptomatic after Ativan had been given.  He was feeling comfortable with discharged home.  I encouraged him to practice healthy coping strategies at home to manage his anxiety and grief from losing his dog.  He was also given instructions on when to return to the ED.  All questions answered and patient discharged home in suitable condition.     I have reviewed the nursing notes.    I have reviewed the findings, diagnosis, plan and need for follow up with the patient.    Discharge Medication List as of 4/15/2022  4:28 PM          Final diagnoses:   Dyspnea   Anxiety reaction     Note: Chart documentation done in part with Dragon Voice Recognition software. Although reviewed after completion, some word and grammatical errors may remain.     4/15/2022   [facility] EMERGENCY DEPT     Jane Peralta PA-C  04/15/22 1527

## 2022-04-15 NOTE — DISCHARGE INSTRUCTIONS
I think some of your symptoms today were a reaction to anxiety and grief.  Your heart looked okay on your blood tests and EKG.  I encourage you to use healthy coping strategies dealing with your loss.    If you do have any worsening symptoms please do not hesitate to return to the emergency department.    Thank you for choosing [facility]'s Emergency Department. It was a pleasure taking care of you today. If you have any questions, please call [phone].    Jane Peralta PA-C

## 2022-05-08 ENCOUNTER — HEALTH MAINTENANCE LETTER (OUTPATIENT)
Age: 37
End: 2022-05-08

## 2023-01-08 ENCOUNTER — HEALTH MAINTENANCE LETTER (OUTPATIENT)
Age: 38
End: 2023-01-08

## 2023-06-02 ENCOUNTER — HEALTH MAINTENANCE LETTER (OUTPATIENT)
Age: 38
End: 2023-06-02

## 2024-06-30 ENCOUNTER — HEALTH MAINTENANCE LETTER (OUTPATIENT)
Age: 39
End: 2024-06-30

## 2025-04-25 NOTE — PROGRESS NOTES
"Office Visit-Fracture Follow up    Chief Complaint: Art Reece is a 35 year old male who is being seen for   Chief Complaint   Patient presents with     RECHECK     (7w1d) Left elbow fx, WORK COMP 3/9/20; XR L elbow 4/28/2020       History of Present Illness:   Mechanism of Injury: Patient had a fall off a wall.  Date of injury was 3/9/2020  Location: Left elbow  Duration of Pain: Since date of injury however the pain has gotten much better and he barely has pain now.  Rating of Pain: Minimal  Pain Quality: Achy and stiffness  Pain is better with: Rest  Pain is worse with: Heavy therapy  Treatment so far consists of: Posterior splint and work restrictions and range of motion.   Associated Features: Patient denies any numbness or tingling.  Patient does feel his range of motion is getting better.  Patient still has some stiffness.  Pain is Limiting: Activity that is full with the left upper extremity  Here to: Orthopedic follow-up and x-ray  Additional History: Patient is working and doing well at work.  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  Patient admits to not really wearing his posterior splint for a long time.  Patient feels he has lifted up to 10 to 15 pounds without any problems with the left upper extremity.  Patient is doing video physical therapy from the [facility].    REVIEW OF SYSTEMS  General: negative for, night sweats, dizziness, fatigue  Resp: No shortness of breath and no cough  CV: negative for chest pain, syncope or near-syncope  GI: negative for nausea, vomiting and diarrhea  : negative for dysuria and hematuria  Musculoskeletal: as above  Neurologic: negative for syncope   Hematologic: negative for bleeding disorder    Physical Exam:  Vitals: /80 (BP Location: Right arm, Cuff Size: Adult Regular)   Ht 1.854 m (6' 1\")   Wt 96.6 kg (213 lb)   BMI 28.10 kg/m    BMI= Body mass " index is 28.1 kg/m .  Constitutional: healthy, alert and no acute distress   Psychiatric: mentation appears normal and affect normal/bright  NEURO: no focal deficits, CMS intact left upper extremity   RESP: Normal with easy respirations and no use of accessory muscles to breathe, no audible wheezing or retractions  CV: +2 radial pulse and his hand is warm to palpation.   SKIN: No erythema, rashes, excoriation, or breakdown. No evidence of infection.   MUSCULOSKELETAL:    INSPECTION of left elbow: No gross deformities, erythema, edema, ecchymosis, atrophy or fasciculations.     PALPATION: Tenderness is noted on the radial head.  No tenderness medial or lateral epicondyle or the olecranon or in the cubital fossa.  No tenderness forearm, wrist or upper arm.  No increased warmth.    ROM: Passive: Elbow flexion 115 degrees, elbow extension approximately 20 degrees short of full , supination 40 degrees, pronation 40 degrees. The range of motion is without catching locking or pain except for supination pronation.        STRENGTH: 5 out of 5 , interosseous and thumb without pain. 5 out of 5 biceps and triceps    SPECIAL TEST:  none 1.  GAIT: non-antalgic  Lymph: no palpable lymph nodes      Diagnostic Modalities:  Recent Results (from the past 744 hour(s))   XR Elbow Left G/E 3 Views    Narrative    LEFT ELBOW THREE OR MORE VIEWS 4/28/2020 2:47 PM     HISTORY: Closed displaced fracture of neck of left radius, initial  encounter. Closed displaced intra-articular fracture of olecranon  process of left ulna, initial encounter.      Impression    IMPRESSION: Left radius neck nondisplaced fracture, alignment  unchanged from 3/16/2020.    SHERYL CAMACHO MD     I agree with the above read.  On the x-rays today I do believe there is mineralization around the radial neck however I do feel there is an area that is not completely healed yet.  There is no other fracture dislocation or tumor.    Independent visualization of the  images was performed.      Impression: 1. 1  Month and 20 days status post left elbow radial head and neck intra-articular fracture as well as olecranon process.  2.  1 month and 20 days status post nasal fracture    Plan:  All of the above pertinent physical exam and imaging modalities findings was reviewed with Art.                                          CONSERVATIVE CARE:    Patient Instructions:   1. Xrays:  On x-ray your fracture looks good and I can see some healing however there is an area on the radial neck is not completely healed yet.  2. Anticoagulation: Not needed for these fractures.  Minimal risk of clot  3. Pain Medication: you are not having much for pain and that is excellent.  Take Tylenol if you need to.  Best thing is to take it when you are doing your therapy.  4. Weight Bearing:  Okay to gradually increase some of your weightbearing now.  You can lift up to 10 poundsAnd then progress from there but listen to your body if you are having some pain then back down  5. Activity/Therapy:  Continue to push your range of motion I really want you to make progress.  Flexion extension supination pronation which is rotating your wrist are the main ones.  Continue to work with OT at the [facility].  I will put in a new order that allows you to progress your range of motion but we will hold on strengthening probably for another 2 weeks.  6. Cast/Splint/Brace/Sling:  Discontinue your posterior splint.  Sounds like you are not really wearing it all that much anyways.    7. Work: This is a Workmen's Comp. injury.    We wrote you a work note that stated okay to push pull lift and carry up to 10 pounds of the left upper extremity now.  Okay to do steps but not ladders.  This will be for the next 2 weeks and then we will reassess.    8.  Nasal fracture: healed  9. Plan: Most likely the smoking is holding down your healing.  We will see her 1 more time in 2 weeks for an x-ray and then after that we  can most likely follow you via telephone.  If x-rays look good next week we can include strengthening.  Continue to work on that range of motion.  Follow-up:  Follow up with Alejandro Brown PA-C in 2 weeks   Re-x-ray on return: Yes we will get AP lateral and oblique view of the left elbow     BP Readings from Last 1 Encounters:   04/28/20 122/80       BP noted to be well controlled today in office.      Patient does use Tobacco products. Patient not ready to quit at this time.  Strongly encouraged smoking cessation.  Risks of smoking and benefits of quitting were discussed.  Information offered: AVS with information about stopping smoking and advised to discuss smoking cessation medications/strategies with Primary care provider.  3-5 Minutes were spent in counseling.    This note was dictated with [employer].    Alejandro Brown PA-C             123

## (undated) DEVICE — PEN MARKING SKIN

## (undated) DEVICE — SOL ISOPROPYL ALCOHOL USP 70% 16OZ  NDC10565-002-16 D0022

## (undated) DEVICE — DRSG GAUZE 4X4" 3033

## (undated) DEVICE — PREP CHLORHEXIDINE 4% 32OZ

## (undated) DEVICE — ESU PENCIL W/HOLSTER

## (undated) DEVICE — BASIN SET MINOR DISP

## (undated) DEVICE — ESU ELEC NDL 1" COATED/INSULATED E1465

## (undated) DEVICE — SU ETHILON 3-0 PS-2 18" 1669H

## (undated) DEVICE — NDL ECLIPSE 25GA 1.5"

## (undated) DEVICE — BNDG ESMARK 4" STERILE

## (undated) DEVICE — PREP SKIN SCRUB TRAY 4461A

## (undated) DEVICE — NDL COUNTER 10CT

## (undated) DEVICE — TUBING SUCTION 12"X1/4" N612

## (undated) DEVICE — SPONGE RAY-TEC 4X4" 7317

## (undated) DEVICE — LABEL MEDICATION SYSTEM  3304

## (undated) DEVICE — BLADE KNIFE SURG 15 371115

## (undated) DEVICE — GLOVE BIOGEL PI SZ 7.5 40875

## (undated) DEVICE — PACK SET-UP STD 9102

## (undated) DEVICE — SYR EAR BULB 2OZ

## (undated) DEVICE — SYR 10ML FINGER CONTROL W/O NDL 309695

## (undated) RX ORDER — ONDANSETRON 2 MG/ML
INJECTION INTRAMUSCULAR; INTRAVENOUS
Status: DISPENSED
Start: 2021-02-12

## (undated) RX ORDER — LIDOCAINE HYDROCHLORIDE 20 MG/ML
INJECTION, SOLUTION EPIDURAL; INFILTRATION; INTRACAUDAL; PERINEURAL
Status: DISPENSED
Start: 2021-02-12

## (undated) RX ORDER — PROPOFOL 10 MG/ML
INJECTION, EMULSION INTRAVENOUS
Status: DISPENSED
Start: 2021-02-12